# Patient Record
Sex: FEMALE | Race: WHITE | Employment: OTHER | RURAL
[De-identification: names, ages, dates, MRNs, and addresses within clinical notes are randomized per-mention and may not be internally consistent; named-entity substitution may affect disease eponyms.]

---

## 2021-07-30 ENCOUNTER — OFFICE VISIT (OUTPATIENT)
Dept: CARDIOLOGY CLINIC | Age: 76
End: 2021-07-30
Payer: MEDICARE

## 2021-07-30 VITALS
RESPIRATION RATE: 16 BRPM | WEIGHT: 119 LBS | DIASTOLIC BLOOD PRESSURE: 98 MMHG | BODY MASS INDEX: 21.9 KG/M2 | HEART RATE: 64 BPM | OXYGEN SATURATION: 98 % | HEIGHT: 62 IN | SYSTOLIC BLOOD PRESSURE: 180 MMHG

## 2021-07-30 DIAGNOSIS — I10 ESSENTIAL HYPERTENSION: Primary | ICD-10-CM

## 2021-07-30 DIAGNOSIS — F41.9 ANXIETY: ICD-10-CM

## 2021-07-30 DIAGNOSIS — E78.5 DYSLIPIDEMIA: ICD-10-CM

## 2021-07-30 DIAGNOSIS — E03.9 ACQUIRED HYPOTHYROIDISM: ICD-10-CM

## 2021-07-30 DIAGNOSIS — R94.31 ABNORMAL EKG: ICD-10-CM

## 2021-07-30 DIAGNOSIS — Z82.49 FH: CAD (CORONARY ARTERY DISEASE): ICD-10-CM

## 2021-07-30 PROCEDURE — G8400 PT W/DXA NO RESULTS DOC: HCPCS | Performed by: INTERNAL MEDICINE

## 2021-07-30 PROCEDURE — 1101F PT FALLS ASSESS-DOCD LE1/YR: CPT | Performed by: INTERNAL MEDICINE

## 2021-07-30 PROCEDURE — G8427 DOCREV CUR MEDS BY ELIG CLIN: HCPCS | Performed by: INTERNAL MEDICINE

## 2021-07-30 PROCEDURE — G8536 NO DOC ELDER MAL SCRN: HCPCS | Performed by: INTERNAL MEDICINE

## 2021-07-30 PROCEDURE — G8510 SCR DEP NEG, NO PLAN REQD: HCPCS | Performed by: INTERNAL MEDICINE

## 2021-07-30 PROCEDURE — 93000 ELECTROCARDIOGRAM COMPLETE: CPT | Performed by: INTERNAL MEDICINE

## 2021-07-30 PROCEDURE — G8420 CALC BMI NORM PARAMETERS: HCPCS | Performed by: INTERNAL MEDICINE

## 2021-07-30 PROCEDURE — 1090F PRES/ABSN URINE INCON ASSESS: CPT | Performed by: INTERNAL MEDICINE

## 2021-07-30 PROCEDURE — 99204 OFFICE O/P NEW MOD 45 MIN: CPT | Performed by: INTERNAL MEDICINE

## 2021-07-30 RX ORDER — LOVASTATIN 20 MG/1
TABLET ORAL
COMMUNITY
Start: 2021-06-02 | End: 2021-07-30 | Stop reason: ALTCHOICE

## 2021-07-30 RX ORDER — LISINOPRIL 10 MG/1
TABLET ORAL
COMMUNITY
End: 2021-07-30

## 2021-07-30 RX ORDER — VALSARTAN 80 MG/1
TABLET ORAL
COMMUNITY
Start: 2021-06-30 | End: 2021-07-30 | Stop reason: SDUPTHER

## 2021-07-30 RX ORDER — ALPRAZOLAM 0.25 MG/1
TABLET ORAL
COMMUNITY
Start: 2021-07-09

## 2021-07-30 RX ORDER — VALSARTAN 80 MG/1
80 TABLET ORAL DAILY
Qty: 90 TABLET | Refills: 1 | Status: SHIPPED | OUTPATIENT
Start: 2021-07-30 | End: 2021-08-31

## 2021-07-30 RX ORDER — ROSUVASTATIN CALCIUM 10 MG/1
10 TABLET, COATED ORAL
Qty: 90 TABLET | Refills: 1 | Status: SHIPPED | OUTPATIENT
Start: 2021-07-30

## 2021-07-30 RX ORDER — PROPRANOLOL HYDROCHLORIDE 60 MG/1
CAPSULE, EXTENDED RELEASE ORAL
COMMUNITY
Start: 2021-07-09

## 2021-07-30 RX ORDER — LEVOTHYROXINE SODIUM 25 UG/1
TABLET ORAL
COMMUNITY
Start: 2021-06-14

## 2021-07-30 RX ORDER — PROPRANOLOL HYDROCHLORIDE 120 MG/1
CAPSULE, EXTENDED RELEASE ORAL
COMMUNITY
Start: 2021-01-14

## 2021-07-30 NOTE — PROGRESS NOTES
Identified pt with two pt identifiers(name and ). Reviewed record in preparation for visit and have obtained necessary documentation. Chief Complaint   Patient presents with    New Patient    Hypertension      Vitals:    21 1332 21 1342   BP: (!) 180/100 (!) 180/98   Pulse: 64    Resp: 16    SpO2: 98%    Weight: 119 lb (54 kg)    Height: 5' 2\" (1.575 m)    PainSc:   0 - No pain        Health Maintenance Review: Patient reminded of \"due or due soon\" health maintenance. I have asked the patient to contact his/her primary care provider (PCP) for follow-up on his/her health maintenance. Coordination of Care Questionnaire:  :   1) Have you been to an emergency room, urgent care, or hospitalized since your last visit? If yes, where when, and reason for visit? no       2. Have seen or consulted any other health care provider since your last visit? If yes, where when, and reason for visit? NO      Patient is accompanied by self I have received verbal consent from Kamini Hartley to discuss any/all medical information while they are present in the room.

## 2021-07-30 NOTE — LETTER
7/30/2021    Patient: Loren Meade   YOB: 1945   Date of Visit: 7/30/2021     Brendan Osorio MD  [address]  Via Fax: [phone]    Dear Brendan Osorio MD,      Thank you for referring Ms. Loren Meade to [facility] for evaluation. My notes for this consultation are attached. If you have questions, please do not hesitate to call me. I look forward to following your patient along with you.       Sincerely,    Mayank Monte MD

## 2021-07-30 NOTE — PROGRESS NOTES
José Miguel Morel is a 68 y.o. female is here for cardiac evaluation. Hx hypertension (labile), dyslipidemia, hypothryoidism, anxiety/insomnia, early FH CAD (brother, sister, mother), followed at [facility], with recent OV's (anxiety, elev BP). Recent lipids 21 with chol 249, ECG598, HDL 84, TG 84, normal CBC, normal CMP, TSH normal..  Had seen Cardiology  in [place] (Dr. Autumn Sparks recommended stress test and echo but not done. Physically active. Currently on propranolol  + 60 (total 180 every day), and valsartan 80 rx'd--not taking for BP; lovastatin 20 for chol. . BP remains elevated. The patient denies chest pain/ shortness of breath, orthopnea, PND, LE edema, palpitations, syncope, presyncope or fatigue.        Patient Active Problem List    Diagnosis Date Noted    HTN (hypertension)     Dyslipidemia     Hypothyroidism     Anxiety     Insomnia       Berta Dunbar MD  Past Medical History:   Diagnosis Date    Anxiety     Dyslipidemia     HTN (hypertension)     Hypothyroidism     Insomnia     Osteoporosis       Past Surgical History:   Procedure Laterality Date    HX  SECTION      HX TUBAL LIGATION       Not on File   Family History   Problem Relation Age of Onset    Heart Disease Sister     Heart Disease Brother     Heart Attack Brother 58        MI, cardiac arrest    Heart Disease Mother       Social History     Socioeconomic History    Marital status: UNKNOWN     Spouse name: Not on file    Number of children: Not on file    Years of education: Not on file    Highest education level: Not on file   Occupational History    Not on file   Tobacco Use    Smoking status: Never Smoker    Smokeless tobacco: Never Used   Substance and Sexual Activity    Alcohol use: Yes     Comment: social    Drug use: Not on file    Sexual activity: Not on file   Other Topics Concern    Not on file   Social History Narrative    Not on file     Social Determinants of Health     Financial Resource Strain:     Difficulty of Paying Living Expenses:    Food Insecurity:     Worried About Running Out of Food in the Last Year:     [address] in the Last Year:    Transportation Needs:     Lack of Transportation (Medical):  Lack of Transportation (Non-Medical):    Physical Activity:     Days of Exercise per Week:     Minutes of Exercise per Session:    Stress:     Feeling of Stress :    Social Connections:     Frequency of Communication with Friends and Family:     Frequency of Social Gatherings with Friends and Family:     Attends [employer] Services:     Active Member of Clubs or Organizations:     Attends Club or Organization Meetings:     Marital Status:    Intimate Partner Violence:     Fear of Current or Ex-Partner:     Emotionally Abused:     Physically Abused:     Sexually Abused:       Current Outpatient Medications   Medication Sig    levothyroxine (SYNTHROID) 25 mcg tablet TAKE 1 TABLET BY MOUTH EVERY MORNING 30 MINUTES BEFORE BREAKFAST    propranolol LA (INDERAL LA) 120 mg SR capsule propranolol  mg capsule,24 hr,extended release   1 po qd    ALPRAZolam (XANAX) 0.25 mg tablet alprazolam 0.25 mg tablet    propranolol LA (INDERAL LA) 60 mg SR capsule TAKE 1 CAPSULE BY MOUTH EVERY DAY    valsartan (DIOVAN) 80 mg tablet Take 1 Tablet by mouth daily.  rosuvastatin (CRESTOR) 10 mg tablet Take 1 Tablet by mouth nightly. Replaces lovastatin     No current facility-administered medications for this visit. Review of Symptoms:    CONST  No weight change. No fever, chills, sweats    ENT No visual changes, URI sx, sore throat    CV  See HPI   RESP  No cough, or sputum, wheezing. Also see HPI   GI  No abdominal pain or change in bowel habits. No heartburn or dysphagia. No melena or rectal bleeding.   No dysuria, urgency, frequency, hematuria   MSKEL  No joint pain, swelling. No muscle pain.     SKIN  No rash or lesions. NEURO  No headache, syncope, or seizure. No weakness, loss of sensation, or paresthesias. PSYCH  No low mood or depression  No anxiety. HE/LYMPH  No easy bruising, abnormal bleeding, or enlarged glands. Physical ExamPhysical Exam:    Visit Vitals  BP (!) 180/98   Pulse 64   Resp 16   Ht 5' 2\" (1.575 m)   Wt 119 lb (54 kg)   SpO2 98%   BMI 21.77 kg/m²     Gen: NAD  HEENT:  PERRL, throat clear  Neck: no adenopathy, no thyromegaly, no JVD   Heart:  Regular,Nl S1S2,  no murmur, gallop or rub. Lungs:  clear  Abdomen:   Soft, non-tender, bowel sounds are active. Extremities:  No edema  Pulse: symmetric  Neuro: A&O times 3, No focal neuro deficits    Cardiographics    ECG: NSR 62, PRWP/possible old ant MI, NST      Assessment:         Patient Active Problem List    Diagnosis Date Noted    HTN (hypertension)     Dyslipidemia     Hypothyroidism     Anxiety     Insomnia       Hx hypertension (labile), dyslipidemia, hypothryoidism, anxiety/insomnia, early FH CAD (brother, sister, mother), followed at [facility], with recent OV's (anxiety, elev BP). Recent lipids 6/1/21 with chol 249, SJA792, HDL 84, TG 84, normal CBC, normal CMP, TSH normal..  Had seen Cardiology 5/18 in [place] (Dr. Robb Romberg recommended stress test and echo but not done. Physically active. Currently on propranolol  + 60 (total 180 every day), and valsartan 80 rx'(not started) for BP; lovastatin 20 for chol. . BP remains elevated.      Plan:     BP suboptimal--will go ahead and start Valsartan 80mg every day, continue Propranolol  (120+60)  Lipids not well controlled--discussed diet, exercise (already walking 3 miles/d)--will change the lovastatin to Crestor 10mg qhs (recheck lipids 3 mos with PCP)  Stress Treadmill EKG (FH, hypertension, dyslipidemia)  Echo--hypertension  Fu depending on testing    Timo Meraz MD

## 2021-08-04 ENCOUNTER — HOSPITAL ENCOUNTER (OUTPATIENT)
Dept: ULTRASOUND IMAGING | Age: 76
Discharge: HOME | End: 2021-08-04
Attending: INTERNAL MEDICINE
Payer: MEDICARE

## 2021-08-04 DIAGNOSIS — R94.31 ABNORMAL EKG: ICD-10-CM

## 2021-08-04 DIAGNOSIS — I10 ESSENTIAL HYPERTENSION: ICD-10-CM

## 2021-08-04 LAB
AV R PG: 50.76 MMHG
ECHO AO ROOT DIAM: 2.68 CM
ECHO AR MAX VEL PISA: 356.04 CM/S
ECHO AV PEAK GRADIENT: 6.09 MMHG
ECHO AV PEAK VELOCITY: 123.4 CM/S
ECHO AV REGURGITANT PHT: 791.39 MS
ECHO EST RA PRESSURE: 10 MMHG
ECHO LA AREA 4C: 13.68 CM2
ECHO LA MAJOR AXIS: 3.62 CM
ECHO LA VOL 2C: 36.98 ML (ref 22–52)
ECHO LA VOL 4C: 35.14 ML (ref 22–52)
ECHO LA VOL BP: 41.17 ML (ref 22–52)
ECHO LV E' SEPTAL VELOCITY: 7.77 CM/S
ECHO LV INTERNAL DIMENSION DIASTOLIC: 3.91 CM (ref 3.9–5.3)
ECHO LV INTERNAL DIMENSION SYSTOLIC: 2.5 CM
ECHO LV IVSD: 0.92 CM (ref 0.6–0.9)
ECHO LV MASS 2D: 109 G (ref 67–162)
ECHO LV POSTERIOR WALL DIASTOLIC: 0.92 CM (ref 0.6–0.9)
ECHO LVOT DIAM: 1.88 CM
ECHO MV A VELOCITY: 66.68 CM/S
ECHO MV E DECELERATION TIME (DT): 207.13 MS
ECHO MV E VELOCITY: 90.74 CM/S
ECHO MV E/A RATIO: 1.36
ECHO MV E/E' SEPTAL: 11.68
ECHO PV REGURGITANT MAX VELOCITY: 109.76 CM/S
ECHO RIGHT VENTRICULAR SYSTOLIC PRESSURE (RVSP): 43.85 MMHG
ECHO TV REGURGITANT MAX VELOCITY: 290.67 CM/S
ECHO TV REGURGITANT PEAK GRADIENT: 33.85 MMHG

## 2021-08-04 PROCEDURE — 93306 TTE W/DOPPLER COMPLETE: CPT

## 2021-08-04 PROCEDURE — 93306 TTE W/DOPPLER COMPLETE: CPT | Performed by: INTERNAL MEDICINE

## 2021-08-06 ENCOUNTER — TELEPHONE (OUTPATIENT)
Dept: CARDIOLOGY CLINIC | Age: 76
End: 2021-08-06

## 2021-08-06 NOTE — TELEPHONE ENCOUNTER
----- Message from Kevin Rosas MD sent at 8/5/2021  8:39 AM EDT -----  Regarding: echo  Advise echo shows normal LV pumping function, mild to moderate mitral and tricuspid regurg--no concerns but needs regular fu. Stress test not yet done. Thanks [facility]    Spoke with the patient. Verified patient with two patient identifiers. Results given and questions answered. Patient verbalized understanding.

## 2021-08-31 ENCOUNTER — OFFICE VISIT (OUTPATIENT)
Dept: CARDIOLOGY CLINIC | Age: 76
End: 2021-08-31
Payer: MEDICARE

## 2021-08-31 VITALS
RESPIRATION RATE: 16 BRPM | DIASTOLIC BLOOD PRESSURE: 90 MMHG | BODY MASS INDEX: 20.98 KG/M2 | OXYGEN SATURATION: 98 % | HEIGHT: 62 IN | HEART RATE: 71 BPM | WEIGHT: 114 LBS | SYSTOLIC BLOOD PRESSURE: 158 MMHG

## 2021-08-31 DIAGNOSIS — R94.31 ABNORMAL EKG: ICD-10-CM

## 2021-08-31 DIAGNOSIS — I10 ESSENTIAL HYPERTENSION: Primary | ICD-10-CM

## 2021-08-31 DIAGNOSIS — E78.5 DYSLIPIDEMIA: ICD-10-CM

## 2021-08-31 PROCEDURE — G8536 NO DOC ELDER MAL SCRN: HCPCS | Performed by: INTERNAL MEDICINE

## 2021-08-31 PROCEDURE — G8400 PT W/DXA NO RESULTS DOC: HCPCS | Performed by: INTERNAL MEDICINE

## 2021-08-31 PROCEDURE — 1101F PT FALLS ASSESS-DOCD LE1/YR: CPT | Performed by: INTERNAL MEDICINE

## 2021-08-31 PROCEDURE — G8432 DEP SCR NOT DOC, RNG: HCPCS | Performed by: INTERNAL MEDICINE

## 2021-08-31 PROCEDURE — 99214 OFFICE O/P EST MOD 30 MIN: CPT | Performed by: INTERNAL MEDICINE

## 2021-08-31 PROCEDURE — G8420 CALC BMI NORM PARAMETERS: HCPCS | Performed by: INTERNAL MEDICINE

## 2021-08-31 PROCEDURE — G8427 DOCREV CUR MEDS BY ELIG CLIN: HCPCS | Performed by: INTERNAL MEDICINE

## 2021-08-31 PROCEDURE — 1090F PRES/ABSN URINE INCON ASSESS: CPT | Performed by: INTERNAL MEDICINE

## 2021-08-31 RX ORDER — VALSARTAN 160 MG/1
160 TABLET ORAL
Qty: 90 TABLET | Refills: 1 | Status: SHIPPED | OUTPATIENT
Start: 2021-08-31

## 2021-08-31 NOTE — PROGRESS NOTES
Kamini Hartley is a 68 y.o. female is here for symptom-based f/u--BP running high. Hx hypertension (labile), dyslipidemia, hypothryoidism, anxiety/insomnia, early FH CAD (brother, sister, mother), followed at [facility], with recent OV's (anxiety, elev BP). Recent lipids 21 with chol 249, UIL768, HDL 84, TG 84, normal CBC, normal CMP, TSH normal..  Had seen Cardiology  in [place] (Dr. Uli Vega recommended stress test and echo but not done. Physically active. Currently on propranolol  + 60 (total 180 every day), and valsartan 80 rx'd. Seen here 21, lovastatin changed to Crestor. BP has continued to run high at home despite addition of the Valsartan 80. ECHO 21 with LVEF 65-70, mild to mod MR/TR with RVSP 43. Stress test not yet done. The patient denies chest pain/ shortness of breath, orthopnea, PND, LE edema, palpitations, syncope, presyncope or fatigue. Patient Active Problem List    Diagnosis Date Noted    HTN (hypertension)     Dyslipidemia     Hypothyroidism     Anxiety     Insomnia       Tara Pierson MD  Past Medical History:   Diagnosis Date    Anxiety     Dyslipidemia     HTN (hypertension)     Hypothyroidism     Insomnia     Osteoporosis       Past Surgical History:   Procedure Laterality Date    HX  SECTION      HX TUBAL LIGATION       No Known Allergies   Family History   Problem Relation Age of Onset    Heart Disease Sister     Heart Disease Brother     Heart Attack Brother 58        MI, cardiac arrest    Heart Disease Mother       Social History     Socioeconomic History    Marital status: UNKNOWN     Spouse name: Not on file    Number of children: Not on file    Years of education: Not on file    Highest education level: Not on file   Occupational History    Not on file   Tobacco Use    Smoking status: Never Smoker    Smokeless tobacco: Never Used   Substance and Sexual Activity    Alcohol use:  Yes Comment: social    Drug use: Not on file    Sexual activity: Not on file   Other Topics Concern    Not on file   Social History Narrative    Not on file     Social Determinants of Health     Financial Resource Strain:     Difficulty of Paying Living Expenses:    Food Insecurity:     Worried About Running Out of Food in the Last Year:     [address] in the Last Year:    Transportation Needs:     Lack of Transportation (Medical):  Lack of Transportation (Non-Medical):    Physical Activity:     Days of Exercise per Week:     Minutes of Exercise per Session:    Stress:     Feeling of Stress :    Social Connections:     Frequency of Communication with Friends and Family:     Frequency of Social Gatherings with Friends and Family:     Attends Jew Services:     Active Member of Clubs or Organizations:     Attends Club or Organization Meetings:     Marital Status:    Intimate Partner Violence:     Fear of Current or Ex-Partner:     Emotionally Abused:     Physically Abused:     Sexually Abused:       Current Outpatient Medications   Medication Sig    levothyroxine (SYNTHROID) 25 mcg tablet TAKE 1 TABLET BY MOUTH EVERY MORNING 30 MINUTES BEFORE BREAKFAST    propranolol LA (INDERAL LA) 120 mg SR capsule propranolol  mg capsule,24 hr,extended release   1 po qd    ALPRAZolam (XANAX) 0.25 mg tablet alprazolam 0.25 mg tablet    propranolol LA (INDERAL LA) 60 mg SR capsule TAKE 1 CAPSULE BY MOUTH EVERY DAY    valsartan (DIOVAN) 80 mg tablet Take 1 Tablet by mouth daily.  rosuvastatin (CRESTOR) 10 mg tablet Take 1 Tablet by mouth nightly. Replaces lovastatin     No current facility-administered medications for this visit. Review of Symptoms:    CONST  No weight change. No fever, chills, sweats    ENT No visual changes, URI sx, sore throat    CV  See HPI   RESP  No cough, or sputum, wheezing. Also see HPI   GI  No abdominal pain or change in bowel habits.   No heartburn or dysphagia. No melena or rectal bleeding.   No dysuria, urgency, frequency, hematuria   MSKEL  No joint pain, swelling. No muscle pain. SKIN  No rash or lesions. NEURO  No headache, syncope, or seizure. No weakness, loss of sensation, or paresthesias. PSYCH  No low mood or depression  No anxiety. HE/LYMPH  No easy bruising, abnormal bleeding, or enlarged glands. Physical ExamPhysical Exam:    Visit Vitals  BP (!) 158/90   Pulse 71   Resp 16   Ht 5' 2\" (1.575 m)   Wt 114 lb (51.7 kg)   SpO2 98%   BMI 20.85 kg/m²     Gen: NAD  HEENT:  PERRL, throat clear  Neck: no adenopathy, no thyromegaly, no JVD   Heart:  Regular,Nl S1S2,  no murmur, gallop or rub. Lungs:  clear  Abdomen:   Soft, non-tender, bowel sounds are active. Extremities:  No edema  Pulse: symmetric  Neuro: A&O times 3, No focal neuro deficits      Assessment:         Patient Active Problem List    Diagnosis Date Noted    HTN (hypertension)     Dyslipidemia     Hypothyroidism     Anxiety     Insomnia      68 y.o. female is here for symptom-based f/u--BP running high. Hx hypertension (labile), dyslipidemia, hypothryoidism, anxiety/insomnia, early FH CAD (brother, sister, mother), followed at [facility], with recent OV's (anxiety, elev BP). Recent lipids 6/1/21 with chol 249, [id number], HDL 84, TG 84, normal CBC, normal CMP, TSH normal..  Had seen Cardiology 5/18 in [place] (Dr. Eden Briceño recommended stress test and echo but not done. Physically active. Currently on propranolol  + 60 (total 180 every day), and valsartan 80 rx'd. Seen here 7/30/21, lovastatin changed to Crestor. BP has continued to run high at home despite addition of the Valsartan 80. ECHO 8/4/21 with LVEF 65-70, mild to mod MR/TR with RVSP 43. Stress test not yet done.       Plan:     BP running high, but some confusion over when to take/what actually taking  Will move the valsartan to qHS and increase to 160mg qhs  Continue propranolol LA (taking 180 mg qAM), continue crestor.     Willie Mccormick MD

## 2021-08-31 NOTE — PROGRESS NOTES
Identified pt with two pt identifiers(name and ). Reviewed record in preparation for visit and have obtained necessary documentation. Chief Complaint   Patient presents with    Medication Evaluation     valsartan added 1mo ago      Vitals:    21 1354 21 1404   BP: (!) 168/96 (!) 158/90   Pulse: 71    Resp: 16    SpO2: 98%    Weight: 114 lb (51.7 kg)    Height: 5' 2\" (1.575 m)    PainSc:   2    PainLoc: Head        Health Maintenance Review: Patient reminded of \"due or due soon\" health maintenance. I have asked the patient to contact his/her primary care provider (PCP) for follow-up on his/her health maintenance. Coordination of Care Questionnaire:  :   1) Have you been to an emergency room, urgent care, or hospitalized since your last visit? If yes, where when, and reason for visit? no       2. Have seen or consulted any other health care provider since your last visit? If yes, where when, and reason for visit? NO      Patient is accompanied by friend I have received verbal consent from Lyubov Thurman to discuss any/all medical information while they are present in the room.

## 2021-09-24 ENCOUNTER — TELEPHONE (OUTPATIENT)
Dept: NON INVASIVE DIAGNOSTICS | Age: 76
End: 2021-09-24

## 2021-09-27 ENCOUNTER — HOSPITAL ENCOUNTER (OUTPATIENT)
Dept: NON INVASIVE DIAGNOSTICS | Age: 76
Discharge: HOME | End: 2021-09-27
Attending: INTERNAL MEDICINE
Payer: MEDICARE

## 2021-09-27 DIAGNOSIS — Z82.49 FH: CAD (CORONARY ARTERY DISEASE): ICD-10-CM

## 2021-09-27 DIAGNOSIS — E78.5 DYSLIPIDEMIA: ICD-10-CM

## 2021-09-27 DIAGNOSIS — R94.31 ABNORMAL EKG: ICD-10-CM

## 2021-09-27 DIAGNOSIS — I10 ESSENTIAL HYPERTENSION: ICD-10-CM

## 2021-09-27 PROCEDURE — 93017 CV STRESS TEST TRACING ONLY: CPT

## 2021-09-28 LAB
STRESS BASELINE DIAS BP: 113 MMHG
STRESS BASELINE HR: 113 BPM
STRESS BASELINE SYS BP: 142 MMHG
STRESS ESTIMATED WORKLOAD: 7 METS
STRESS EXERCISE DUR MIN: NORMAL MIN:SEC
STRESS PEAK DIAS BP: 120 MMHG
STRESS PEAK SYS BP: 202 MMHG
STRESS PERCENT HR ACHIEVED: 120 %
STRESS POST PEAK HR: 173 BPM
STRESS RATE PRESSURE PRODUCT: NORMAL BPM*MMHG
STRESS TARGET HR: 144 BPM

## 2021-09-29 ENCOUNTER — TELEPHONE (OUTPATIENT)
Dept: CARDIOLOGY CLINIC | Age: 76
End: 2021-09-29

## 2021-09-29 NOTE — TELEPHONE ENCOUNTER
----- Message from Chris Graham MD sent at 9/28/2021  8:11 AM EDT -----  Regarding: stress test  Advise stress test normal--no blockages or ischemia, however BP high. We had increased the valsartan to 160mg qhs along with the propranolol. If she has been taking (and took prior to stress test), would increase the valsartan to 160mg bid and monitor BP at home. Call if still elevated and should fu with PCP as planned. We can see back in 6 mos, sooner prn. Thanks [facility]    Spoke with the patient. Verified patient with two patient identifiers. Results (+echo) given and questions answered. Pt needs to review her medications before any modification. She will call back today. Patient verbalized understanding.

## 2021-09-29 NOTE — TELEPHONE ENCOUNTER
Pt returned the call. Verified patient with two patient identifiers. She held the propranolol 24 hrs prior. Will seek clarification from MD regarding valsartan increase before she doubles up.

## 2021-09-30 NOTE — TELEPHONE ENCOUNTER
Dr. Pablo Elias message relayed. Message  Received: Ubaldo Gillespie MD Ancel Hidalgo, LPN  Caller: Unspecified Chemo Escobar,  8:49 AM)  \"No  let's monitor BP at home now that back on propranolol, if BP remains elevated, then I would increase the valsartan to BID. \"      Left the pt a message x2 regarding.

## 2022-11-05 ENCOUNTER — APPOINTMENT (OUTPATIENT)
Dept: CT IMAGING | Age: 77
End: 2022-11-05
Attending: EMERGENCY MEDICINE
Payer: MEDICARE

## 2022-11-05 ENCOUNTER — HOSPITAL ENCOUNTER (EMERGENCY)
Age: 77
Discharge: HOME | End: 2022-11-05
Attending: EMERGENCY MEDICINE
Payer: MEDICARE

## 2022-11-05 VITALS
TEMPERATURE: 98.5 F | RESPIRATION RATE: 18 BRPM | SYSTOLIC BLOOD PRESSURE: 126 MMHG | HEART RATE: 65 BPM | OXYGEN SATURATION: 100 % | DIASTOLIC BLOOD PRESSURE: 58 MMHG
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
## 2022-11-05 DIAGNOSIS — S02.2XXA CLOSED FRACTURE OF NASAL BONE, INITIAL ENCOUNTER: ICD-10-CM

## 2022-11-05 PROCEDURE — 70486 CT MAXILLOFACIAL W/O DYE: CPT

## 2022-11-05 PROCEDURE — 99284 EMERGENCY DEPT VISIT MOD MDM: CPT

## 2022-11-05 PROCEDURE — 70450 CT HEAD/BRAIN W/O DYE: CPT

## 2022-11-05 RX ORDER — AMOXICILLIN 875 MG/1
875 TABLET, FILM COATED ORAL 2 TIMES DAILY
Qty: 20 TABLET | Refills: 0 | Status: SHIPPED | OUTPATIENT
Start: 2022-11-05 | End: 2022-11-15

## 2022-11-05 NOTE — ED PROVIDER NOTES
EMERGENCY DEPARTMENT HISTORY AND PHYSICAL EXAM      Date: 2022  Patient Name: Eric Blanchard    History of Presenting Illness     Chief Complaint   Patient presents with    Fall       History Provided By: Patient    HPI: Eric Blanchard, 68 y.o. female with PMHx significant for hypertension and high cholesterol, presents ambulatory to the ED with cc of fall. Patient reports yesterday around noon she tripped going up the stairs and fell forward hitting her face on the step. She noticed moderate bruising to her right infraorbital area and over the bridge of her nose. She denies any vision changes. No neck pain. No nausea or vomiting. No other injuries. Pain well controlled. Does complain of an aching in the bruised area. She had some brief nosebleeding initially. It resolved yesterday shortly after the accident. No loss of consciousness. No significant headache. No problems ambulating. She is not on any anticoagulants. Tetanus up-to-date. PMHx: Significant for hypertension, high cholesterol, hypothyroidism  PSHx: Significant for , BTL  Social Hx: Non-smoker. Rarely drinks alcohol. There are no other complaints, changes, or physical findings at this time. PCP: Anupam Donnelly MD    No current facility-administered medications on file prior to encounter. Current Outpatient Medications on File Prior to Encounter   Medication Sig Dispense Refill    valsartan (DIOVAN) 160 mg tablet Take 1 Tablet by mouth nightly.  Increased dose 90 Tablet 1    levothyroxine (SYNTHROID) 25 mcg tablet TAKE 1 TABLET BY MOUTH EVERY MORNING 30 MINUTES BEFORE BREAKFAST      propranolol LA (INDERAL LA) 120 mg SR capsule propranolol  mg capsule,24 hr,extended release   1 po qd      ALPRAZolam (XANAX) 0.25 mg tablet alprazolam 0.25 mg tablet      propranolol LA (INDERAL LA) 60 mg SR capsule TAKE 1 CAPSULE BY MOUTH EVERY DAY      rosuvastatin (CRESTOR) 10 mg tablet Take 1 Tablet by mouth nightly. Replaces lovastatin 90 Tablet 1       Past History     Past Medical History:  Past Medical History:   Diagnosis Date    Anxiety     Dyslipidemia     HTN (hypertension)     Hypothyroidism     Insomnia     Osteoporosis        Past Surgical History:  Past Surgical History:   Procedure Laterality Date    HX  SECTION      HX TUBAL LIGATION         Family History:  Family History   Problem Relation Age of Onset    Heart Disease Sister     Heart Disease Brother     Heart Attack Brother 58        MI, cardiac arrest    Heart Disease Mother        Social History:  Social History     Tobacco Use    Smoking status: Never    Smokeless tobacco: Never   Substance Use Topics    Alcohol use: Yes     Comment: social       Allergies:  No Known Allergies      Review of Systems   Review of Systems   Constitutional:  Negative for activity change, chills and fever. HENT:  Negative for congestion and sore throat. Eyes:  Negative for pain and redness. Respiratory:  Negative for cough, chest tightness and shortness of breath. Cardiovascular:  Negative for chest pain and palpitations. Gastrointestinal:  Negative for abdominal pain, diarrhea, nausea and vomiting. Genitourinary:  Negative for dysuria, frequency and urgency. Musculoskeletal:  Negative for back pain and neck pain. Skin:  Positive for rash. Neurological:  Negative for syncope, light-headedness and headaches. Psychiatric/Behavioral:  Negative for confusion. All other systems reviewed and are negative. Physical Exam   Physical Exam  Vitals and nursing note reviewed. Constitutional:       General: She is not in acute distress. Appearance: She is well-developed. She is not diaphoretic. HENT:      Head:      Comments: Moderate ecchymosis of the infraorbital area on the right. There is mild swelling over the bridge of the nose. There are some abrasions over the bridge of the nose. No active bleeding.   There is some dried blood in the bilateral naris. No septal hematoma. EOMI. No evidence of entrapment on upward or downward gaze. Nose: Nose normal.      Mouth/Throat:      Pharynx: No oropharyngeal exudate. Eyes:      General: No scleral icterus. Conjunctiva/sclera: Conjunctivae normal.      Pupils: Pupils are equal, round, and reactive to light. Neck:      Thyroid: No thyromegaly. Vascular: No JVD. Trachea: No tracheal deviation. Cardiovascular:      Rate and Rhythm: Normal rate and regular rhythm. Heart sounds: No murmur heard. No friction rub. No gallop. Pulmonary:      Effort: Pulmonary effort is normal. No respiratory distress. Breath sounds: Normal breath sounds. No stridor. No wheezing or rales. Abdominal:      General: Bowel sounds are normal. There is no distension. Palpations: Abdomen is soft. Tenderness: There is no abdominal tenderness. There is no guarding or rebound. Musculoskeletal:         General: Normal range of motion. Cervical back: Normal range of motion and neck supple. Lymphadenopathy:      Cervical: No cervical adenopathy. Skin:     General: Skin is warm and dry. Findings: No erythema or rash. Neurological:      Mental Status: She is alert and oriented to person, place, and time. Cranial Nerves: No cranial nerve deficit. Motor: No abnormal muscle tone. Coordination: Coordination normal.   Psychiatric:         Behavior: Behavior normal.           Diagnostic Study Results     Labs -   No results found for this or any previous visit (from the past 12 hour(s)). Radiologic Studies -   CT MAXILLOFACIAL WO CONT   Final Result   Nasal bone fracture with soft tissue swelling and emphysema. CT HEAD WO CONT   Final Result   No acute intracranial finding. CT Results  (Last 48 hours)                 11/05/22 1255  CT MAXILLOFACIAL WO CONT Final result    Impression:  Nasal bone fracture with soft tissue swelling and emphysema. Narrative:  EXAM: CT MAXILLOFACIAL WO CONT       INDICATION: trauma       COMPARISON: None. CONTRAST:   None. TECHNIQUE:  Multislice helical CT of the facial bones was performed in the axial   plane without intravenous contrast administration. Coronal and sagittal   reformations were generated. CT dose reduction was achieved through use of a   standardized protocol tailored for this examination and automatic exposure   control for dose modulation. FINDINGS:       Bones: There is nasal bone fracture without significant displacement. There is   right soft tissue swelling and soft tissue emphysema. Orbital walls are intact. Paranasal sinuses: Clear       Orbits: The globes, optic nerves, and extraocular muscles are within normal   limits. Miscellaneous: N/A            11/05/22 1255  CT HEAD WO CONT Final result    Impression:  No acute intracranial finding. Narrative:  EXAM: Head CT without Contrast:       TECHNIQUE: Unenhanced CT Head is performed. CT dose reduction was achieved   through use of a standardized protocol tailored for this examination and   automatic exposure control for dose modulation. INDICATION:  fall;trauma       FINDINGS:    There is no apparent acute infarct or mass, and no bleed, shift, obstructive   hydrocephalus or significant extra-axial fluid collection. Left cerebral white matter hypodensity is present, nonspecific, but favoring   chronic microangiopathy. Bone windows are unremarkable. CXR Results  (Last 48 hours)      None              Medical Decision Making   I am the first provider for this patient. I reviewed the vital signs, available nursing notes, past medical history, past surgical history, family history and social history. Vital Signs-Reviewed the patient's vital signs.   Patient Vitals for the past 12 hrs:   Temp Pulse Resp BP SpO2   11/05/22 1228 98.5 °F (36.9 °C) 65 18 (!) 126/58 100 % Records Reviewed: Nursing notes reviewed    Provider Notes (Medical Decision Making):   DDX: Facial contusion, orbital fracture, ICH. ED Course:   Initial assessment performed. The patients presenting problems have been discussed, and they are in agreement with the care plan formulated and outlined with them. I have encouraged them to ask questions as they arise throughout their visit. PROGRESS NOTE    Pt reevaluated. CT head without any acute findings. No evidence of ICH. CT max face with nasal fracture. No evidence of orbital fracture. No septal hematoma. Recommended plastic surgery follow-up in several weeks if desired. Reassured patient. Will discharge on amoxicillin due to nasal fracture. Written by Juan R Aly MD     Progress note:    Pt noted to be feeling better and ready for discharge. Updated pt and/or family on all final lab and/or  imaging findings. Will follow up as instructed. All questions have been answered, pt voiced understanding and agreement with plan. Abx were prescribed, pt advised that diarrhea and rash are possible side effects of the medications. Specific return precautions provided as well as instructions to return to the ED should sx worsen at any time. Vital signs stable for discharge. I have also put together some discharge instructions for them that include: 1) educational information regarding their diagnosis, 2) how to care for their diagnosis at home, as well a 3) list of reasons why they would want to return to the ED prior to their follow-up appointment, should their condition change. Written by Juan R Aly MD        Critical Care Time:   0    Disposition:  Discharge    PLAN:  1. Current Discharge Medication List        START taking these medications    Details   amoxicillin (AMOXIL) 875 mg tablet Take 1 Tablet by mouth two (2) times a day for 10 days.   Qty: 20 Tablet, Refills: 0  Start date: 11/5/2022, End date: 11/15/2022           2. Follow-up Information       Follow up With Specialties Details Why Contact Info    Benji Cortes MD Family Medicine Schedule an appointment as soon as possible for a visit in 1 week  [address] [address]  [phone]      [address] [facility] Emergency Medicine Go in 1 day If symptoms worsen [address] [facility]  [facility]  Schedule an appointment as soon as possible for a visit in 2 weeks As needed [facility] [address] [facility] [address]          Return to ED if worse     Diagnosis     Clinical Impression:   1. Fall, initial encounter    2. Closed fracture of nasal bone, initial encounter              Please note that this dictation was completed with [employer], the computer voice recognition software. Quite often unanticipated grammatical, syntax, homophones, and other interpretive errors are inadvertently transcribed by the computer software. Please disregard these errors. Please excuse any errors that have escaped final proofreading.

## 2022-11-05 NOTE — ED TRIAGE NOTES
Pt arrives with concerns over swelling and bruising to right eye after a fall approx 2 days ago.  Pain only with palpation

## 2022-11-05 NOTE — ED NOTES
Received assignment, assuming care. Pt fall yesterday when going up stairs onto face. Pt not on blood thinners, no LOC.

## 2022-11-06 ENCOUNTER — HOSPITAL ENCOUNTER (EMERGENCY)
Age: 77
Discharge: HOME | End: 2022-11-06
Attending: EMERGENCY MEDICINE
Payer: MEDICARE

## 2022-11-06 VITALS
SYSTOLIC BLOOD PRESSURE: 123 MMHG | DIASTOLIC BLOOD PRESSURE: 58 MMHG | WEIGHT: 115 LBS | HEART RATE: 63 BPM | OXYGEN SATURATION: 99 % | TEMPERATURE: 98.2 F | BODY MASS INDEX: 21.03 KG/M2

## 2022-11-06 DIAGNOSIS — S00.83XD FACIAL CONTUSION, SUBSEQUENT ENCOUNTER: Primary | ICD-10-CM

## 2022-11-06 PROCEDURE — 99282 EMERGENCY DEPT VISIT SF MDM: CPT

## 2022-11-06 NOTE — ED PROVIDER NOTES
EMERGENCY DEPARTMENT HISTORY AND PHYSICAL EXAM      Date: 2022  Patient Name: Daniel Amezcua    History of Presenting Illness     Chief Complaint   Patient presents with    Wound Check       History Provided By: Patient    HPI: Daniel Amezcua, 68 y.o. female with PMHx significant for hypertension, high cholesterol, presents ambulatory to the ED with cc of facial swelling. Patient had a ground-level fall yesterday. She tripped going up the stairs and fell forward hitting her face on the step. She had moderate bruising in her right inferior orbital area. Head, max face and C-spine CTs were negative here in the ED. No evidence of eye injury. She reports she had some mildly increased swelling in the area and deepening redness which concerned her and prompted her return visit today. No significantly increased pain. No fevers or chills. No vision complaints. No eye redness. No eye drainage. No bleeding. PMHx: Significant for hypertension, high cholesterol  PSHx: Significant for , BTL  Social Hx: Non-smoker. Rarely drinks alcohol. There are no other complaints, changes, or physical findings at this time. PCP: Rachael Melara MD    No current facility-administered medications on file prior to encounter. Current Outpatient Medications on File Prior to Encounter   Medication Sig Dispense Refill    amoxicillin (AMOXIL) 875 mg tablet Take 1 Tablet by mouth two (2) times a day for 10 days. 20 Tablet 0    valsartan (DIOVAN) 160 mg tablet Take 1 Tablet by mouth nightly.  Increased dose 90 Tablet 1    levothyroxine (SYNTHROID) 25 mcg tablet TAKE 1 TABLET BY MOUTH EVERY MORNING 30 MINUTES BEFORE BREAKFAST      propranolol LA (INDERAL LA) 120 mg SR capsule propranolol  mg capsule,24 hr,extended release   1 po qd      ALPRAZolam (XANAX) 0.25 mg tablet alprazolam 0.25 mg tablet      propranolol LA (INDERAL LA) 60 mg SR capsule TAKE 1 CAPSULE BY MOUTH EVERY DAY      rosuvastatin (CRESTOR) 10 mg tablet Take 1 Tablet by mouth nightly. Replaces lovastatin 90 Tablet 1       Past History     Past Medical History:  Past Medical History:   Diagnosis Date    Anxiety     Dyslipidemia     HTN (hypertension)     Hypothyroidism     Insomnia     Osteoporosis        Past Surgical History:  Past Surgical History:   Procedure Laterality Date    HX  SECTION      HX TUBAL LIGATION         Family History:  Family History   Problem Relation Age of Onset    Heart Disease Sister     Heart Disease Brother     Heart Attack Brother 58        MI, cardiac arrest    Heart Disease Mother        Social History:  Social History     Tobacco Use    Smoking status: Never    Smokeless tobacco: Never   Substance Use Topics    Alcohol use: Yes     Comment: social       Allergies:  No Known Allergies      Review of Systems   Review of Systems   Constitutional:  Negative for activity change, chills and fever. HENT:  Negative for congestion and sore throat. Eyes:  Negative for pain and redness. Respiratory:  Negative for cough, chest tightness and shortness of breath. Cardiovascular:  Negative for chest pain and palpitations. Gastrointestinal:  Negative for abdominal pain, diarrhea, nausea and vomiting. Genitourinary:  Negative for dysuria, frequency and urgency. Musculoskeletal:  Negative for back pain and neck pain. Skin:  Positive for rash. Neurological:  Negative for syncope, light-headedness and headaches. Psychiatric/Behavioral:  Negative for confusion. All other systems reviewed and are negative. Physical Exam   Physical Exam  Vitals and nursing note reviewed. Constitutional:       General: She is not in acute distress. Appearance: She is well-developed. She is not diaphoretic. HENT:      Head:      Comments: Moderate ecchymosis of inferior orbital area/right cheek. Mild soft tissue swelling. No bony step-offs. EOMI. No evidence of entrapment. Conjunctive a clear.   No eye drainage. Nose: Nose normal.      Mouth/Throat:      Pharynx: No oropharyngeal exudate. Eyes:      General: No scleral icterus. Conjunctiva/sclera: Conjunctivae normal.      Pupils: Pupils are equal, round, and reactive to light. Neck:      Thyroid: No thyromegaly. Vascular: No JVD. Trachea: No tracheal deviation. Cardiovascular:      Rate and Rhythm: Normal rate and regular rhythm. Heart sounds: No murmur heard. No friction rub. No gallop. Pulmonary:      Effort: Pulmonary effort is normal. No respiratory distress. Breath sounds: Normal breath sounds. No stridor. No wheezing or rales. Abdominal:      General: Bowel sounds are normal. There is no distension. Palpations: Abdomen is soft. Tenderness: There is no abdominal tenderness. There is no guarding or rebound. Musculoskeletal:         General: Normal range of motion. Cervical back: Normal range of motion and neck supple. Lymphadenopathy:      Cervical: No cervical adenopathy. Skin:     General: Skin is warm and dry. Findings: No erythema or rash. Neurological:      Mental Status: She is alert and oriented to person, place, and time. Cranial Nerves: No cranial nerve deficit. Motor: No abnormal muscle tone. Coordination: Coordination normal.   Psychiatric:         Behavior: Behavior normal.           Diagnostic Study Results     Labs -   No results found for this or any previous visit (from the past 12 hour(s)). Radiologic Studies -   No orders to display     CT Results  (Last 48 hours)                 11/05/22 1255  CT MAXILLOFACIAL WO CONT Final result    Impression:  Nasal bone fracture with soft tissue swelling and emphysema. Narrative:  EXAM: CT MAXILLOFACIAL WO CONT       INDICATION: trauma       COMPARISON: None. CONTRAST:   None.        TECHNIQUE:  Multislice helical CT of the facial bones was performed in the axial   plane without intravenous contrast administration. Coronal and sagittal   reformations were generated. CT dose reduction was achieved through use of a   standardized protocol tailored for this examination and automatic exposure   control for dose modulation. FINDINGS:       Bones: There is nasal bone fracture without significant displacement. There is   right soft tissue swelling and soft tissue emphysema. Orbital walls are intact. Paranasal sinuses: Clear       Orbits: The globes, optic nerves, and extraocular muscles are within normal   limits. Miscellaneous: N/A            11/05/22 1255  CT HEAD WO CONT Final result    Impression:  No acute intracranial finding. Narrative:  EXAM: Head CT without Contrast:       TECHNIQUE: Unenhanced CT Head is performed. CT dose reduction was achieved   through use of a standardized protocol tailored for this examination and   automatic exposure control for dose modulation. INDICATION:  fall;trauma       FINDINGS:    There is no apparent acute infarct or mass, and no bleed, shift, obstructive   hydrocephalus or significant extra-axial fluid collection. Left cerebral white matter hypodensity is present, nonspecific, but favoring   chronic microangiopathy. Bone windows are unremarkable. CXR Results  (Last 48 hours)      None              Medical Decision Making   I am the first provider for this patient. I reviewed the vital signs, available nursing notes, past medical history, past surgical history, family history and social history. Vital Signs-Reviewed the patient's vital signs. Patient Vitals for the past 12 hrs:   Temp Pulse BP SpO2   11/06/22 1141 98.2 °F (36.8 °C) 63 (!) 123/58 99 %           Records Reviewed: Nursing notes reviewed    Provider Notes (Medical Decision Making):   DDX:Hematoma, facial contusion    ED Course:   Initial assessment performed.  The patients presenting problems have been discussed, and they are in agreement with the care plan formulated and outlined with them. I have encouraged them to ask questions as they arise throughout their visit. PROGRESS NOTE    Pt reevaluated. Patient seen by me yesterday after ground-level fall. Negative CT imaging of head face and C-spine. Patient had mildly increased swelling and deepening of redness of ecchymotic area on right cheek. No worrisome findings on exam.  Reassured patient that this was typical healing of a hematoma that might take 2 to 3 weeks to resolve. Written by Janeen Locke MD     Progress note:    Pt noted to be feeling better and ready for discharge. Updated pt and/or family on all final lab and/or  imaging findings. Will follow up as instructed. All questions have been answered, pt voiced understanding and agreement with plan. Specific return precautions provided as well as instructions to return to the ED should sx worsen at any time. Vital signs stable for discharge. I have also put together some discharge instructions for them that include: 1) educational information regarding their diagnosis, 2) how to care for their diagnosis at home, as well a 3) list of reasons why they would want to return to the ED prior to their follow-up appointment, should their condition change. Written by Janeen Locke MD        Critical Care Time:   0    Disposition:  Discharge    PLAN:  1. Current Discharge Medication List        2. Follow-up Information       Follow up With Specialties Details Why Contact Info    Jacinda Fitzgerald MD Family Medicine Schedule an appointment as soon as possible for a visit in 1 week  [address]  [phone]            Return to ED if worse     Diagnosis     Clinical Impression:   1. Facial contusion, subsequent encounter              Please note that this dictation was completed with [employer], the [employer] voice recognition software.   Quite often unanticipated grammatical, syntax, homophones, and other interpretive errors are inadvertently transcribed by the computer software. Please disregard these errors. Please excuse any errors that have escaped final proofreading.

## 2022-11-06 NOTE — ED TRIAGE NOTES
Pt arrives with concerns of \"internal bleeding\" after experiencing a fall a few days ago. She was seen here yesterday and assessed by MD, CT scan performed. Pt voices concern d/t furthered swelling.

## 2023-05-15 ENCOUNTER — APPOINTMENT (OUTPATIENT)
Facility: HOSPITAL | Age: 78
End: 2023-05-15
Payer: MEDICARE

## 2023-05-15 ENCOUNTER — HOSPITAL ENCOUNTER (EMERGENCY)
Facility: HOSPITAL | Age: 78
Discharge: HOME | End: 2023-05-15
Attending: EMERGENCY MEDICINE
Payer: MEDICARE

## 2023-05-15 VITALS
OXYGEN SATURATION: 100 % | WEIGHT: 111 LBS | DIASTOLIC BLOOD PRESSURE: 82 MMHG | RESPIRATION RATE: 17 BRPM | HEIGHT: 62 IN | TEMPERATURE: 98.3 F | SYSTOLIC BLOOD PRESSURE: 151 MMHG | BODY MASS INDEX: 20.43 KG/M2 | HEART RATE: 65 BPM

## 2023-05-15 DIAGNOSIS — M79.605 LEFT LEG PAIN: ICD-10-CM

## 2023-05-15 DIAGNOSIS — R03.0 ELEVATED BLOOD PRESSURE READING: Primary | ICD-10-CM

## 2023-05-15 DIAGNOSIS — F41.9 ANXIETY: ICD-10-CM

## 2023-05-15 LAB — ECHO BSA: 1.48 M2

## 2023-05-15 PROCEDURE — 93971 EXTREMITY STUDY: CPT

## 2023-05-15 PROCEDURE — 99284 EMERGENCY DEPT VISIT MOD MDM: CPT

## 2023-05-15 RX ORDER — PROPRANOLOL HCL 60 MG
60 CAPSULE, EXTENDED RELEASE 24HR ORAL DAILY
Qty: 30 CAPSULE | Refills: 0 | Status: SHIPPED | OUTPATIENT
Start: 2023-05-15

## 2023-05-15 RX ORDER — ROSUVASTATIN CALCIUM 10 MG/1
10 TABLET, COATED ORAL DAILY
Qty: 30 TABLET | Refills: 0 | Status: SHIPPED | OUTPATIENT
Start: 2023-05-15

## 2023-05-15 RX ORDER — ALPRAZOLAM 0.25 MG/1
TABLET ORAL
Qty: 14 TABLET | Refills: 0 | Status: SHIPPED | OUTPATIENT
Start: 2023-05-15 | End: 2023-06-29

## 2023-05-15 RX ORDER — LEVOTHYROXINE SODIUM 0.03 MG/1
TABLET ORAL
Qty: 30 TABLET | Refills: 0 | Status: SHIPPED | OUTPATIENT
Start: 2023-05-15

## 2023-05-15 RX ORDER — VALSARTAN 160 MG/1
160 TABLET ORAL DAILY
Qty: 30 TABLET | Refills: 0 | Status: SHIPPED | OUTPATIENT
Start: 2023-05-15

## 2023-05-15 ASSESSMENT — PAIN - FUNCTIONAL ASSESSMENT: PAIN_FUNCTIONAL_ASSESSMENT: 0-10

## 2023-05-15 ASSESSMENT — LIFESTYLE VARIABLES
HOW OFTEN DO YOU HAVE A DRINK CONTAINING ALCOHOL: NEVER
HOW MANY STANDARD DRINKS CONTAINING ALCOHOL DO YOU HAVE ON A TYPICAL DAY: PATIENT DOES NOT DRINK

## 2023-05-15 ASSESSMENT — PAIN SCALES - GENERAL: PAINLEVEL_OUTOF10: 7

## 2023-05-15 NOTE — ED PROVIDER NOTES
\[facility]\"" EMERGENCY DEP  EMERGENCY DEPARTMENT ENCOUNTER       Pt Name: Analia Welch  MRN: [id number]  [place] 1945  Date of evaluation: 5/15/2023  Provider: Iain Carlson MD   PCP: Elijah Sabillon MD  Note Started: 3:25 PM 5/15/23     CHIEF COMPLAINT       Chief Complaint   Patient presents with    Leg Pain        HISTORY OF PRESENT ILLNESS: 1 or more elements      History From: Patient     Analia Welch is a 66 y.o. female who presents saying she feels like she is going to have another stroke. When asked why, she says her SHOLA has been running high. She is low on medications so she went to her PCP office today asking for a refill and they were unable to see her so recommended she come to the ER. Patient denies headache, dizziness, weakness and numbness. She states she just feels how she did before her TIA and cant explain what that means. I also asked her if she is having leg pain but she denies (despite her triage complaint of leg pain). Patient told the triage nurse she wants to know if she has a clot in her left lower leg. Nursing Notes were all reviewed and agreed with or any disagreements were addressed in the HPI. REVIEW OF SYSTEMS      Review of Systems   Constitutional:  Negative for activity change, appetite change, chills, fatigue and fever. HENT:  Negative for congestion. Eyes:  Negative for visual disturbance. Respiratory:  Negative for cough. Cardiovascular:  Negative for chest pain and leg swelling. Gastrointestinal:  Negative for abdominal pain, diarrhea and vomiting. Genitourinary:  Negative for dysuria. Musculoskeletal:  Negative for gait problem. Skin:  Negative for color change and rash. Neurological:  Negative for dizziness and weakness. Positives and Pertinent negatives as per HPI.     PAST HISTORY     Past Medical History:  Past Medical History:   Diagnosis Date    Anxiety     Dyslipidemia     HTN (hypertension)     Hypothyroidism     Insomnia

## 2023-05-15 NOTE — ED TRIAGE NOTES
Pt presents with left lower leg pain and swelling x 1 week. Pt had a DVT 2 years ago in the same leg. Pt denies any dyspnea or chest pain.

## 2023-05-18 ASSESSMENT — ENCOUNTER SYMPTOMS
VOMITING: 0
COUGH: 0
DIARRHEA: 0
ABDOMINAL PAIN: 0
COLOR CHANGE: 0

## 2023-05-19 ENCOUNTER — APPOINTMENT (OUTPATIENT)
Facility: HOSPITAL | Age: 78
End: 2023-05-19
Payer: MEDICARE

## 2023-05-19 ENCOUNTER — HOSPITAL ENCOUNTER (EMERGENCY)
Facility: HOSPITAL | Age: 78
Discharge: HOME | End: 2023-05-19
Attending: EMERGENCY MEDICINE
Payer: MEDICARE

## 2023-05-19 VITALS
RESPIRATION RATE: 16 BRPM | SYSTOLIC BLOOD PRESSURE: 170 MMHG | WEIGHT: 111 LBS | BODY MASS INDEX: 20.3 KG/M2 | DIASTOLIC BLOOD PRESSURE: 89 MMHG | HEART RATE: 80 BPM | TEMPERATURE: 98 F

## 2023-05-19 DIAGNOSIS — M79.605 LEFT LEG PAIN: Primary | ICD-10-CM

## 2023-05-19 PROCEDURE — 99284 EMERGENCY DEPT VISIT MOD MDM: CPT

## 2023-05-19 PROCEDURE — 93971 EXTREMITY STUDY: CPT

## 2023-05-19 ASSESSMENT — ENCOUNTER SYMPTOMS
EYE REDNESS: 0
SORE THROAT: 0
VOMITING: 0
COUGH: 0
NAUSEA: 0
DIARRHEA: 0
SHORTNESS OF BREATH: 0
ABDOMINAL PAIN: 0

## 2023-05-19 ASSESSMENT — PAIN SCALES - GENERAL: PAINLEVEL_OUTOF10: 2

## 2023-05-19 ASSESSMENT — PAIN - FUNCTIONAL ASSESSMENT
PAIN_FUNCTIONAL_ASSESSMENT: ACTIVITIES ARE NOT PREVENTED
PAIN_FUNCTIONAL_ASSESSMENT: 0-10

## 2023-05-19 ASSESSMENT — PAIN DESCRIPTION - DESCRIPTORS: DESCRIPTORS: ACHING

## 2023-05-19 ASSESSMENT — LIFESTYLE VARIABLES: HOW OFTEN DO YOU HAVE A DRINK CONTAINING ALCOHOL: NEVER

## 2023-05-19 ASSESSMENT — PAIN DESCRIPTION - ORIENTATION: ORIENTATION: LEFT;LOWER

## 2023-05-19 NOTE — ED PROVIDER NOTES
EMERGENCY DEPARTMENT HISTORY AND PHYSICAL EXAM      Date: 5/19/2023  Patient Name: Tracey Catalan    History of Presenting Illness     Chief Complaint   Patient presents with    Leg Pain       History Provided By: Patient    HPI: Tracey Catalan, 66 y.o. female with past medical history listed below, presents via private vehicle to the ED with cc of left leg pain. Patient sent here by PCP for Doppler ultrasound to rule out DVT. Patient reports she had a ground-level fall several weeks ago since then she has had pain in her right lateral calf area. Symptoms been going on for 2 weeks. Pain is worse if she ambulates. She has been able to bear weight without difficulty. She states she normally ambulates 2 miles a day for exercise. She is otherwise without complaints. No chest pain or shortness of breath. She did not notice any rash in the area. She does notice a painful lump when she presses on it. There are no other complaints, changes, or physical findings at this time. PCP: Angelita Hitchcock MD    No current facility-administered medications on file prior to encounter.      Current Outpatient Medications on File Prior to Encounter   Medication Sig Dispense Refill    levothyroxine (SYNTHROID) 25 MCG tablet TAKE 1 TABLET BY MOUTH EVERY MORNING 30 MINUTES BEFORE BREAKFAST 30 tablet 0    propranolol (INDERAL LA) 60 MG extended release capsule Take 1 capsule by mouth daily Take 1 tablet by mouth daily 30 capsule 0    valsartan (DIOVAN) 160 MG tablet Take 1 tablet by mouth daily 30 tablet 0    rosuvastatin (CRESTOR) 10 MG tablet Take 1 tablet by mouth daily 30 tablet 0    ALPRAZolam (XANAX) 0.25 MG tablet alprazolam 0.25 mg tablet 14 tablet 0       Past History     Past Medical History:  Past Medical History:   Diagnosis Date    Anxiety     Dyslipidemia     HTN (hypertension)     Hypothyroidism     Insomnia     Osteoporosis        Past Surgical History:  Past Surgical History:   Procedure Laterality

## 2023-05-19 NOTE — ED NOTES
Written and verbal discharge instructions reviewed with patient. All discharge medications reviewed and explained. Understanding verbalized, all questions answered. Ambulated out, gait steady.        Rebecca Elam RN  05/19/23 5709

## 2023-05-23 ENCOUNTER — APPOINTMENT (OUTPATIENT)
Facility: HOSPITAL | Age: 78
End: 2023-05-23
Payer: MEDICARE

## 2023-05-23 ENCOUNTER — HOSPITAL ENCOUNTER (INPATIENT)
Facility: HOSPITAL | Age: 78
LOS: 3 days | Discharge: HOME | End: 2023-05-29
Attending: EMERGENCY MEDICINE | Admitting: INTERNAL MEDICINE
Payer: MEDICARE

## 2023-05-23 DIAGNOSIS — R55 SYNCOPE AND COLLAPSE: ICD-10-CM

## 2023-05-23 DIAGNOSIS — S00.10XA ECCHYMOSIS OF EYELID: ICD-10-CM

## 2023-05-23 DIAGNOSIS — F41.9 ANXIETY: Primary | ICD-10-CM

## 2023-05-23 PROBLEM — R41.89 COGNITIVE IMPAIRMENT: Chronic | Status: ACTIVE | Noted: 2023-05-23

## 2023-05-23 PROBLEM — F10.90 CHRONIC ALCOHOL USE: Chronic | Status: ACTIVE | Noted: 2023-05-23

## 2023-05-23 LAB
ALBUMIN SERPL-MCNC: 3.3 G/DL (ref 3.5–5)
ALBUMIN/GLOB SERPL: 1.1 (ref 1.1–2.2)
ALP SERPL-CCNC: 74 U/L (ref 45–117)
ALT SERPL-CCNC: 23 U/L (ref 12–78)
ANION GAP SERPL CALC-SCNC: 10 MMOL/L (ref 5–15)
APPEARANCE UR: CLEAR
AST SERPL-CCNC: 33 U/L (ref 15–37)
BACTERIA URNS QL MICRO: NEGATIVE /HPF
BASOPHILS # BLD: 0 K/UL (ref 0–0.1)
BASOPHILS NFR BLD: 0 % (ref 0–1)
BILIRUB SERPL-MCNC: 0.7 MG/DL (ref 0.2–1)
BILIRUB UR QL: NEGATIVE
BUN SERPL-MCNC: 17 MG/DL (ref 6–20)
BUN/CREAT SERPL: 16 (ref 12–20)
CALCIUM SERPL-MCNC: 8.5 MG/DL (ref 8.5–10.1)
CHLORIDE SERPL-SCNC: 100 MMOL/L (ref 97–108)
CK SERPL-CCNC: 400 U/L (ref 26–192)
CO2 SERPL-SCNC: 29 MMOL/L (ref 21–32)
COLOR UR: NORMAL
CREAT SERPL-MCNC: 1.09 MG/DL (ref 0.55–1.02)
DIFFERENTIAL METHOD BLD: ABNORMAL
EOSINOPHIL # BLD: 0.2 K/UL (ref 0–0.4)
EOSINOPHIL NFR BLD: 3 % (ref 0–7)
EPITH CASTS URNS QL MICRO: NORMAL /LPF
ERYTHROCYTE [DISTWIDTH] IN BLOOD BY AUTOMATED COUNT: 13.2 % (ref 11.5–14.5)
ETHANOL SERPL-MCNC: <10 MG/DL (ref 0–0.08)
GLOBULIN SER CALC-MCNC: 3.1 G/DL (ref 2–4)
GLUCOSE SERPL-MCNC: 102 MG/DL (ref 65–100)
GLUCOSE UR STRIP.AUTO-MCNC: NEGATIVE MG/DL
HCT VFR BLD AUTO: 35 % (ref 35–47)
HGB BLD-MCNC: 11.8 G/DL (ref 11.5–16)
HGB UR QL STRIP: NEGATIVE
IMM GRANULOCYTES # BLD AUTO: 0 K/UL (ref 0–0.04)
IMM GRANULOCYTES NFR BLD AUTO: 0 % (ref 0–0.5)
INR PPP: 1 (ref 0.9–1.1)
KETONES UR QL STRIP.AUTO: NEGATIVE MG/DL
LEUKOCYTE ESTERASE UR QL STRIP.AUTO: NEGATIVE
LYMPHOCYTES # BLD: 1 K/UL (ref 0.8–3.5)
LYMPHOCYTES NFR BLD: 14 % (ref 12–49)
MAGNESIUM SERPL-MCNC: 1.6 MG/DL (ref 1.6–2.4)
MCH RBC QN AUTO: 35.4 PG (ref 26–34)
MCHC RBC AUTO-ENTMCNC: 33.7 G/DL (ref 30–36.5)
MCV RBC AUTO: 105.1 FL (ref 80–99)
MONOCYTES # BLD: 0.7 K/UL (ref 0–1)
MONOCYTES NFR BLD: 11 % (ref 5–13)
NEUTS SEG # BLD: 4.9 K/UL (ref 1.8–8)
NEUTS SEG NFR BLD: 71 % (ref 32–75)
NITRITE UR QL STRIP.AUTO: NEGATIVE
NRBC # BLD: 0 K/UL (ref 0–0.01)
NRBC BLD-RTO: 0 PER 100 WBC
PH UR STRIP: 6 (ref 5–8)
PLATELET # BLD AUTO: 307 K/UL (ref 150–400)
PMV BLD AUTO: 9.6 FL (ref 8.9–12.9)
POTASSIUM SERPL-SCNC: 3.2 MMOL/L (ref 3.5–5.1)
PROT SERPL-MCNC: 6.4 G/DL (ref 6.4–8.2)
PROT UR STRIP-MCNC: NEGATIVE MG/DL
PROTHROMBIN TIME: 9.9 SEC (ref 9–11.1)
RBC # BLD AUTO: 3.33 M/UL (ref 3.8–5.2)
RBC #/AREA URNS HPF: NORMAL /HPF (ref 0–5)
SODIUM SERPL-SCNC: 139 MMOL/L (ref 136–145)
SP GR UR REFRACTOMETRY: 1.01 (ref 1–1.03)
UROBILINOGEN UR QL STRIP.AUTO: 0.2 EU/DL (ref 0.2–1)
WBC # BLD AUTO: 6.9 K/UL (ref 3.6–11)
WBC URNS QL MICRO: NORMAL /HPF (ref 0–4)

## 2023-05-23 PROCEDURE — 36415 COLL VENOUS BLD VENIPUNCTURE: CPT

## 2023-05-23 PROCEDURE — 96360 HYDRATION IV INFUSION INIT: CPT

## 2023-05-23 PROCEDURE — 96361 HYDRATE IV INFUSION ADD-ON: CPT

## 2023-05-23 PROCEDURE — 82550 ASSAY OF CK (CPK): CPT

## 2023-05-23 PROCEDURE — 2580000003 HC RX 258: Performed by: EMERGENCY MEDICINE

## 2023-05-23 PROCEDURE — 81001 URINALYSIS AUTO W/SCOPE: CPT

## 2023-05-23 PROCEDURE — G0378 HOSPITAL OBSERVATION PER HR: HCPCS

## 2023-05-23 PROCEDURE — 82077 ASSAY SPEC XCP UR&BREATH IA: CPT

## 2023-05-23 PROCEDURE — 70551 MRI BRAIN STEM W/O DYE: CPT

## 2023-05-23 PROCEDURE — 2580000003 HC RX 258: Performed by: INTERNAL MEDICINE

## 2023-05-23 PROCEDURE — 96372 THER/PROPH/DIAG INJ SC/IM: CPT

## 2023-05-23 PROCEDURE — 99285 EMERGENCY DEPT VISIT HI MDM: CPT

## 2023-05-23 PROCEDURE — 85610 PROTHROMBIN TIME: CPT

## 2023-05-23 PROCEDURE — 97110 THERAPEUTIC EXERCISES: CPT

## 2023-05-23 PROCEDURE — 73502 X-RAY EXAM HIP UNI 2-3 VIEWS: CPT

## 2023-05-23 PROCEDURE — 80053 COMPREHEN METABOLIC PANEL: CPT

## 2023-05-23 PROCEDURE — 83735 ASSAY OF MAGNESIUM: CPT

## 2023-05-23 PROCEDURE — 97530 THERAPEUTIC ACTIVITIES: CPT

## 2023-05-23 PROCEDURE — 93005 ELECTROCARDIOGRAM TRACING: CPT | Performed by: EMERGENCY MEDICINE

## 2023-05-23 PROCEDURE — 72125 CT NECK SPINE W/O DYE: CPT

## 2023-05-23 PROCEDURE — 6370000000 HC RX 637 (ALT 250 FOR IP): Performed by: INTERNAL MEDICINE

## 2023-05-23 PROCEDURE — 97162 PT EVAL MOD COMPLEX 30 MIN: CPT

## 2023-05-23 PROCEDURE — 70486 CT MAXILLOFACIAL W/O DYE: CPT

## 2023-05-23 PROCEDURE — 97166 OT EVAL MOD COMPLEX 45 MIN: CPT

## 2023-05-23 PROCEDURE — 70450 CT HEAD/BRAIN W/O DYE: CPT

## 2023-05-23 PROCEDURE — 85025 COMPLETE CBC W/AUTO DIFF WBC: CPT

## 2023-05-23 PROCEDURE — 6360000002 HC RX W HCPCS: Performed by: INTERNAL MEDICINE

## 2023-05-23 RX ORDER — 0.9 % SODIUM CHLORIDE 0.9 %
1000 INTRAVENOUS SOLUTION INTRAVENOUS ONCE
Status: COMPLETED | OUTPATIENT
Start: 2023-05-23 | End: 2023-05-23

## 2023-05-23 RX ORDER — BETAMETHASONE DIPROPIONATE 0.5 MG/G
CREAM TOPICAL
COMMUNITY
Start: 2018-03-08

## 2023-05-23 RX ORDER — ACETAMINOPHEN 325 MG/1
650 TABLET ORAL EVERY 6 HOURS PRN
Status: DISCONTINUED | OUTPATIENT
Start: 2023-05-23 | End: 2023-05-29 | Stop reason: HOSPADM

## 2023-05-23 RX ORDER — LISINOPRIL 10 MG/1
TABLET ORAL
Status: ON HOLD | COMMUNITY
Start: 2018-04-19 | End: 2023-05-29 | Stop reason: HOSPADM

## 2023-05-23 RX ORDER — ENOXAPARIN SODIUM 100 MG/ML
30 INJECTION SUBCUTANEOUS DAILY
Status: DISCONTINUED | OUTPATIENT
Start: 2023-05-23 | End: 2023-05-29 | Stop reason: HOSPADM

## 2023-05-23 RX ORDER — LOSARTAN POTASSIUM 100 MG/1
100 TABLET ORAL
Status: DISCONTINUED | OUTPATIENT
Start: 2023-05-23 | End: 2023-05-29 | Stop reason: HOSPADM

## 2023-05-23 RX ORDER — HYDROCHLOROTHIAZIDE 12.5 MG/1
TABLET ORAL
COMMUNITY
Start: 2022-05-09

## 2023-05-23 RX ORDER — POTASSIUM CHLORIDE 750 MG/1
10 TABLET, FILM COATED, EXTENDED RELEASE ORAL
Status: COMPLETED | OUTPATIENT
Start: 2023-05-23 | End: 2023-05-25

## 2023-05-23 RX ORDER — LEVOTHYROXINE SODIUM 0.03 MG/1
25 TABLET ORAL
Status: DISCONTINUED | OUTPATIENT
Start: 2023-05-24 | End: 2023-05-29 | Stop reason: HOSPADM

## 2023-05-23 RX ORDER — LOSARTAN POTASSIUM 100 MG/1
TABLET ORAL
COMMUNITY
Start: 2022-04-14

## 2023-05-23 RX ORDER — SODIUM CHLORIDE 0.9 % (FLUSH) 0.9 %
5-40 SYRINGE (ML) INJECTION EVERY 12 HOURS SCHEDULED
Status: DISCONTINUED | OUTPATIENT
Start: 2023-05-23 | End: 2023-05-29 | Stop reason: HOSPADM

## 2023-05-23 RX ORDER — SODIUM CHLORIDE 9 MG/ML
INJECTION, SOLUTION INTRAVENOUS CONTINUOUS
Status: DISCONTINUED | OUTPATIENT
Start: 2023-05-23 | End: 2023-05-24

## 2023-05-23 RX ORDER — PROPRANOLOL HYDROCHLORIDE 120 MG/1
CAPSULE, EXTENDED RELEASE ORAL DAILY
Status: ON HOLD | COMMUNITY
Start: 2023-03-23 | End: 2023-05-29 | Stop reason: HOSPADM

## 2023-05-23 RX ORDER — SODIUM CHLORIDE 9 MG/ML
INJECTION, SOLUTION INTRAVENOUS PRN
Status: DISCONTINUED | OUTPATIENT
Start: 2023-05-23 | End: 2023-05-29 | Stop reason: HOSPADM

## 2023-05-23 RX ORDER — LOVASTATIN 10 MG/1
TABLET ORAL
Status: ON HOLD | COMMUNITY
End: 2023-05-29 | Stop reason: HOSPADM

## 2023-05-23 RX ORDER — DONEPEZIL HYDROCHLORIDE 10 MG/1
10 TABLET, FILM COATED ORAL NIGHTLY
Qty: 30 TABLET | Refills: 5 | COMMUNITY
Start: 2022-12-29 | End: 2023-06-27

## 2023-05-23 RX ORDER — ACETAMINOPHEN 650 MG/1
650 SUPPOSITORY RECTAL EVERY 6 HOURS PRN
Status: DISCONTINUED | OUTPATIENT
Start: 2023-05-23 | End: 2023-05-29 | Stop reason: HOSPADM

## 2023-05-23 RX ORDER — ONDANSETRON 2 MG/ML
4 INJECTION INTRAMUSCULAR; INTRAVENOUS EVERY 6 HOURS PRN
Status: DISCONTINUED | OUTPATIENT
Start: 2023-05-23 | End: 2023-05-29 | Stop reason: HOSPADM

## 2023-05-23 RX ORDER — SODIUM CHLORIDE 0.9 % (FLUSH) 0.9 %
5-40 SYRINGE (ML) INJECTION PRN
Status: DISCONTINUED | OUTPATIENT
Start: 2023-05-23 | End: 2023-05-29 | Stop reason: HOSPADM

## 2023-05-23 RX ORDER — POLYETHYLENE GLYCOL 3350 17 G/17G
17 POWDER, FOR SOLUTION ORAL DAILY PRN
Status: DISCONTINUED | OUTPATIENT
Start: 2023-05-23 | End: 2023-05-29 | Stop reason: HOSPADM

## 2023-05-23 RX ORDER — ZOLPIDEM TARTRATE 10 MG/1
10 TABLET ORAL NIGHTLY PRN
Status: ON HOLD | COMMUNITY
Start: 2023-04-21 | End: 2023-05-29 | Stop reason: HOSPADM

## 2023-05-23 RX ORDER — ONDANSETRON 4 MG/1
4 TABLET, ORALLY DISINTEGRATING ORAL EVERY 8 HOURS PRN
Status: DISCONTINUED | OUTPATIENT
Start: 2023-05-23 | End: 2023-05-29 | Stop reason: HOSPADM

## 2023-05-23 RX ORDER — PROPRANOLOL HCL 60 MG
180 CAPSULE, EXTENDED RELEASE 24HR ORAL DAILY
Status: DISCONTINUED | OUTPATIENT
Start: 2023-05-23 | End: 2023-05-24

## 2023-05-23 RX ADMIN — SODIUM CHLORIDE: 9 INJECTION, SOLUTION INTRAVENOUS at 13:56

## 2023-05-23 RX ADMIN — SODIUM CHLORIDE 1000 ML: 9 INJECTION, SOLUTION INTRAVENOUS at 09:43

## 2023-05-23 RX ADMIN — ENOXAPARIN SODIUM 30 MG: 100 INJECTION SUBCUTANEOUS at 16:16

## 2023-05-23 RX ADMIN — LOSARTAN POTASSIUM 100 MG: 100 TABLET, FILM COATED ORAL at 17:11

## 2023-05-23 RX ADMIN — POTASSIUM CHLORIDE 10 MEQ: 750 TABLET, FILM COATED, EXTENDED RELEASE ORAL at 20:06

## 2023-05-23 ASSESSMENT — PAIN SCALES - GENERAL: PAINLEVEL_OUTOF10: 6

## 2023-05-23 ASSESSMENT — LIFESTYLE VARIABLES
HOW MANY STANDARD DRINKS CONTAINING ALCOHOL DO YOU HAVE ON A TYPICAL DAY: PATIENT DOES NOT DRINK
HOW OFTEN DO YOU HAVE A DRINK CONTAINING ALCOHOL: NEVER

## 2023-05-23 ASSESSMENT — PAIN DESCRIPTION - LOCATION: LOCATION: ELBOW;HIP;LEG

## 2023-05-23 ASSESSMENT — PAIN - FUNCTIONAL ASSESSMENT: PAIN_FUNCTIONAL_ASSESSMENT: 0-10

## 2023-05-23 ASSESSMENT — PAIN DESCRIPTION - ORIENTATION: ORIENTATION: RIGHT

## 2023-05-23 ASSESSMENT — PAIN DESCRIPTION - ONSET: ONSET: SUDDEN

## 2023-05-23 ASSESSMENT — PAIN DESCRIPTION - PAIN TYPE: TYPE: ACUTE PAIN

## 2023-05-24 LAB
ANION GAP SERPL CALC-SCNC: 12 MMOL/L (ref 5–15)
BASOPHILS # BLD: 0 K/UL (ref 0–0.1)
BASOPHILS NFR BLD: 1 % (ref 0–1)
BUN SERPL-MCNC: 14 MG/DL (ref 6–20)
BUN/CREAT SERPL: 16 (ref 12–20)
CALCIUM SERPL-MCNC: 7.6 MG/DL (ref 8.5–10.1)
CHLORIDE SERPL-SCNC: 105 MMOL/L (ref 97–108)
CO2 SERPL-SCNC: 23 MMOL/L (ref 21–32)
CREAT SERPL-MCNC: 0.88 MG/DL (ref 0.55–1.02)
DIFFERENTIAL METHOD BLD: ABNORMAL
EOSINOPHIL # BLD: 0.4 K/UL (ref 0–0.4)
EOSINOPHIL NFR BLD: 7 % (ref 0–7)
ERYTHROCYTE [DISTWIDTH] IN BLOOD BY AUTOMATED COUNT: 13.2 % (ref 11.5–14.5)
FOLATE SERPL-MCNC: 26.6 NG/ML (ref 5–21)
GLUCOSE SERPL-MCNC: 75 MG/DL (ref 65–100)
HCT VFR BLD AUTO: 30.4 % (ref 35–47)
HEMOCCULT STL QL: NEGATIVE
HGB BLD-MCNC: 9.9 G/DL (ref 11.5–16)
IMM GRANULOCYTES # BLD AUTO: 0 K/UL (ref 0–0.04)
IMM GRANULOCYTES NFR BLD AUTO: 0 % (ref 0–0.5)
LYMPHOCYTES # BLD: 1.1 K/UL (ref 0.8–3.5)
LYMPHOCYTES NFR BLD: 20 % (ref 12–49)
MAGNESIUM SERPL-MCNC: 1.6 MG/DL (ref 1.6–2.4)
MCH RBC QN AUTO: 34.5 PG (ref 26–34)
MCHC RBC AUTO-ENTMCNC: 32.6 G/DL (ref 30–36.5)
MCV RBC AUTO: 105.9 FL (ref 80–99)
MONOCYTES # BLD: 0.6 K/UL (ref 0–1)
MONOCYTES NFR BLD: 11 % (ref 5–13)
NEUTS SEG # BLD: 3.4 K/UL (ref 1.8–8)
NEUTS SEG NFR BLD: 61 % (ref 32–75)
NRBC # BLD: 0 K/UL (ref 0–0.01)
NRBC BLD-RTO: 0 PER 100 WBC
PLATELET # BLD AUTO: 256 K/UL (ref 150–400)
PMV BLD AUTO: 10.1 FL (ref 8.9–12.9)
POTASSIUM SERPL-SCNC: 3.1 MMOL/L (ref 3.5–5.1)
RBC # BLD AUTO: 2.87 M/UL (ref 3.8–5.2)
SODIUM SERPL-SCNC: 140 MMOL/L (ref 136–145)
VIT B12 SERPL-MCNC: 283 PG/ML (ref 193–986)
WBC # BLD AUTO: 5.4 K/UL (ref 3.6–11)

## 2023-05-24 PROCEDURE — G0378 HOSPITAL OBSERVATION PER HR: HCPCS

## 2023-05-24 PROCEDURE — 83735 ASSAY OF MAGNESIUM: CPT

## 2023-05-24 PROCEDURE — 82607 VITAMIN B-12: CPT

## 2023-05-24 PROCEDURE — 82272 OCCULT BLD FECES 1-3 TESTS: CPT

## 2023-05-24 PROCEDURE — 6370000000 HC RX 637 (ALT 250 FOR IP): Performed by: INTERNAL MEDICINE

## 2023-05-24 PROCEDURE — 96361 HYDRATE IV INFUSION ADD-ON: CPT

## 2023-05-24 PROCEDURE — 96372 THER/PROPH/DIAG INJ SC/IM: CPT

## 2023-05-24 PROCEDURE — 36415 COLL VENOUS BLD VENIPUNCTURE: CPT

## 2023-05-24 PROCEDURE — 80048 BASIC METABOLIC PNL TOTAL CA: CPT

## 2023-05-24 PROCEDURE — 85025 COMPLETE CBC W/AUTO DIFF WBC: CPT

## 2023-05-24 PROCEDURE — 6360000002 HC RX W HCPCS: Performed by: INTERNAL MEDICINE

## 2023-05-24 PROCEDURE — 97535 SELF CARE MNGMENT TRAINING: CPT

## 2023-05-24 PROCEDURE — 2580000003 HC RX 258: Performed by: INTERNAL MEDICINE

## 2023-05-24 PROCEDURE — 82746 ASSAY OF FOLIC ACID SERUM: CPT

## 2023-05-24 PROCEDURE — 97110 THERAPEUTIC EXERCISES: CPT

## 2023-05-24 RX ORDER — PROPRANOLOL HCL 60 MG
120 CAPSULE, EXTENDED RELEASE 24HR ORAL DAILY
Status: DISCONTINUED | OUTPATIENT
Start: 2023-05-24 | End: 2023-05-29 | Stop reason: HOSPADM

## 2023-05-24 RX ORDER — NAPROXEN 250 MG/1
250 TABLET ORAL 2 TIMES DAILY WITH MEALS
Status: DISCONTINUED | OUTPATIENT
Start: 2023-05-24 | End: 2023-05-29 | Stop reason: HOSPADM

## 2023-05-24 RX ORDER — TRAZODONE HYDROCHLORIDE 50 MG/1
50 TABLET ORAL NIGHTLY
Status: DISCONTINUED | OUTPATIENT
Start: 2023-05-24 | End: 2023-05-29 | Stop reason: HOSPADM

## 2023-05-24 RX ADMIN — SERTRALINE 50 MG: 50 TABLET, FILM COATED ORAL at 09:19

## 2023-05-24 RX ADMIN — PROPRANOLOL HYDROCHLORIDE 120 MG: 60 CAPSULE, EXTENDED RELEASE ORAL at 09:18

## 2023-05-24 RX ADMIN — POTASSIUM CHLORIDE 10 MEQ: 750 TABLET, FILM COATED, EXTENDED RELEASE ORAL at 20:18

## 2023-05-24 RX ADMIN — ENOXAPARIN SODIUM 30 MG: 100 INJECTION SUBCUTANEOUS at 09:19

## 2023-05-24 RX ADMIN — SODIUM CHLORIDE: 9 INJECTION, SOLUTION INTRAVENOUS at 03:00

## 2023-05-24 RX ADMIN — ACETAMINOPHEN 650 MG: 325 TABLET ORAL at 12:52

## 2023-05-24 RX ADMIN — POTASSIUM CHLORIDE 10 MEQ: 750 TABLET, FILM COATED, EXTENDED RELEASE ORAL at 09:19

## 2023-05-24 RX ADMIN — LOSARTAN POTASSIUM 100 MG: 100 TABLET, FILM COATED ORAL at 16:35

## 2023-05-24 RX ADMIN — LEVOTHYROXINE SODIUM 25 MCG: 0.03 TABLET ORAL at 06:33

## 2023-05-24 RX ADMIN — NAPROXEN 250 MG: 250 TABLET ORAL at 20:17

## 2023-05-24 RX ADMIN — TRAZODONE HYDROCHLORIDE 50 MG: 50 TABLET ORAL at 20:17

## 2023-05-24 ASSESSMENT — PAIN SCALES - GENERAL
PAINLEVEL_OUTOF10: 3
PAINLEVEL_OUTOF10: 8

## 2023-05-24 ASSESSMENT — PAIN DESCRIPTION - LOCATION
LOCATION: LEG
LOCATION: HEAD

## 2023-05-24 ASSESSMENT — PAIN DESCRIPTION - ORIENTATION: ORIENTATION: LEFT

## 2023-05-24 ASSESSMENT — PAIN DESCRIPTION - DESCRIPTORS: DESCRIPTORS: ACHING

## 2023-05-25 LAB
ANION GAP SERPL CALC-SCNC: 10 MMOL/L (ref 5–15)
BUN SERPL-MCNC: 8 MG/DL (ref 6–20)
BUN/CREAT SERPL: 9 (ref 12–20)
CALCIUM SERPL-MCNC: 8 MG/DL (ref 8.5–10.1)
CHLORIDE SERPL-SCNC: 104 MMOL/L (ref 97–108)
CK SERPL-CCNC: 1355 U/L (ref 26–192)
CO2 SERPL-SCNC: 26 MMOL/L (ref 21–32)
CREAT SERPL-MCNC: 0.93 MG/DL (ref 0.55–1.02)
GLUCOSE SERPL-MCNC: 82 MG/DL (ref 65–100)
HGB BLD-MCNC: 10.4 G/DL (ref 11.5–16)
MAGNESIUM SERPL-MCNC: 1.6 MG/DL (ref 1.6–2.4)
POTASSIUM SERPL-SCNC: 3.2 MMOL/L (ref 3.5–5.1)
SODIUM SERPL-SCNC: 140 MMOL/L (ref 136–145)

## 2023-05-25 PROCEDURE — 97110 THERAPEUTIC EXERCISES: CPT

## 2023-05-25 PROCEDURE — 83735 ASSAY OF MAGNESIUM: CPT

## 2023-05-25 PROCEDURE — G0378 HOSPITAL OBSERVATION PER HR: HCPCS

## 2023-05-25 PROCEDURE — 36415 COLL VENOUS BLD VENIPUNCTURE: CPT

## 2023-05-25 PROCEDURE — 6370000000 HC RX 637 (ALT 250 FOR IP): Performed by: INTERNAL MEDICINE

## 2023-05-25 PROCEDURE — 82550 ASSAY OF CK (CPK): CPT

## 2023-05-25 PROCEDURE — 94760 N-INVAS EAR/PLS OXIMETRY 1: CPT

## 2023-05-25 PROCEDURE — 85018 HEMOGLOBIN: CPT

## 2023-05-25 PROCEDURE — 97535 SELF CARE MNGMENT TRAINING: CPT

## 2023-05-25 PROCEDURE — 97112 NEUROMUSCULAR REEDUCATION: CPT

## 2023-05-25 PROCEDURE — 80048 BASIC METABOLIC PNL TOTAL CA: CPT

## 2023-05-25 PROCEDURE — 96372 THER/PROPH/DIAG INJ SC/IM: CPT

## 2023-05-25 PROCEDURE — 6360000002 HC RX W HCPCS: Performed by: INTERNAL MEDICINE

## 2023-05-25 RX ADMIN — PROPRANOLOL HYDROCHLORIDE 120 MG: 60 CAPSULE, EXTENDED RELEASE ORAL at 08:32

## 2023-05-25 RX ADMIN — NAPROXEN 250 MG: 250 TABLET ORAL at 17:28

## 2023-05-25 RX ADMIN — NAPROXEN 250 MG: 250 TABLET ORAL at 08:33

## 2023-05-25 RX ADMIN — POTASSIUM CHLORIDE 10 MEQ: 750 TABLET, FILM COATED, EXTENDED RELEASE ORAL at 21:08

## 2023-05-25 RX ADMIN — LEVOTHYROXINE SODIUM 25 MCG: 0.03 TABLET ORAL at 06:52

## 2023-05-25 RX ADMIN — TRAZODONE HYDROCHLORIDE 50 MG: 50 TABLET ORAL at 21:08

## 2023-05-25 RX ADMIN — LOSARTAN POTASSIUM 100 MG: 100 TABLET, FILM COATED ORAL at 17:28

## 2023-05-25 RX ADMIN — ENOXAPARIN SODIUM 30 MG: 100 INJECTION SUBCUTANEOUS at 08:33

## 2023-05-25 RX ADMIN — POTASSIUM CHLORIDE 10 MEQ: 750 TABLET, FILM COATED, EXTENDED RELEASE ORAL at 08:33

## 2023-05-25 RX ADMIN — SERTRALINE 50 MG: 50 TABLET, FILM COATED ORAL at 08:33

## 2023-05-26 PROBLEM — N17.9 ARF (ACUTE RENAL FAILURE) (HCC): Status: ACTIVE | Noted: 2023-05-26

## 2023-05-26 PROBLEM — F33.1 MODERATE EPISODE OF RECURRENT MAJOR DEPRESSIVE DISORDER (HCC): Status: ACTIVE | Noted: 2023-05-26

## 2023-05-26 LAB
CK SERPL-CCNC: 1812 U/L (ref 26–192)
POTASSIUM SERPL-SCNC: 3.8 MMOL/L (ref 3.5–5.1)

## 2023-05-26 PROCEDURE — 94760 N-INVAS EAR/PLS OXIMETRY 1: CPT

## 2023-05-26 PROCEDURE — 97112 NEUROMUSCULAR REEDUCATION: CPT

## 2023-05-26 PROCEDURE — 97535 SELF CARE MNGMENT TRAINING: CPT

## 2023-05-26 PROCEDURE — 6370000000 HC RX 637 (ALT 250 FOR IP): Performed by: PSYCHIATRY & NEUROLOGY

## 2023-05-26 PROCEDURE — 96372 THER/PROPH/DIAG INJ SC/IM: CPT

## 2023-05-26 PROCEDURE — 82550 ASSAY OF CK (CPK): CPT

## 2023-05-26 PROCEDURE — 36415 COLL VENOUS BLD VENIPUNCTURE: CPT

## 2023-05-26 PROCEDURE — 84132 ASSAY OF SERUM POTASSIUM: CPT

## 2023-05-26 PROCEDURE — 97110 THERAPEUTIC EXERCISES: CPT

## 2023-05-26 PROCEDURE — 6360000002 HC RX W HCPCS: Performed by: INTERNAL MEDICINE

## 2023-05-26 PROCEDURE — 90792 PSYCH DIAG EVAL W/MED SRVCS: CPT | Performed by: PSYCHIATRY & NEUROLOGY

## 2023-05-26 PROCEDURE — 1100000000 HC RM PRIVATE

## 2023-05-26 PROCEDURE — 6370000000 HC RX 637 (ALT 250 FOR IP): Performed by: INTERNAL MEDICINE

## 2023-05-26 RX ORDER — GABAPENTIN 100 MG/1
200 CAPSULE ORAL 2 TIMES DAILY PRN
Status: DISCONTINUED | OUTPATIENT
Start: 2023-05-26 | End: 2023-05-29 | Stop reason: HOSPADM

## 2023-05-26 RX ORDER — MIRTAZAPINE 7.5 MG/1
7.5 TABLET, FILM COATED ORAL NIGHTLY
Status: DISCONTINUED | OUTPATIENT
Start: 2023-05-26 | End: 2023-05-29 | Stop reason: HOSPADM

## 2023-05-26 RX ORDER — SERTRALINE HYDROCHLORIDE 25 MG/1
25 TABLET, FILM COATED ORAL DAILY
Status: DISCONTINUED | OUTPATIENT
Start: 2023-05-27 | End: 2023-05-29 | Stop reason: HOSPADM

## 2023-05-26 RX ADMIN — MIRTAZAPINE 7.5 MG: 7.5 TABLET ORAL at 20:06

## 2023-05-26 RX ADMIN — ENOXAPARIN SODIUM 30 MG: 100 INJECTION SUBCUTANEOUS at 08:39

## 2023-05-26 RX ADMIN — LOSARTAN POTASSIUM 100 MG: 100 TABLET, FILM COATED ORAL at 16:50

## 2023-05-26 RX ADMIN — NAPROXEN 250 MG: 250 TABLET ORAL at 16:49

## 2023-05-26 RX ADMIN — SERTRALINE 50 MG: 50 TABLET, FILM COATED ORAL at 08:40

## 2023-05-26 RX ADMIN — NAPROXEN 250 MG: 250 TABLET ORAL at 08:40

## 2023-05-26 RX ADMIN — LEVOTHYROXINE SODIUM 25 MCG: 0.03 TABLET ORAL at 06:22

## 2023-05-26 RX ADMIN — PROPRANOLOL HYDROCHLORIDE 120 MG: 60 CAPSULE, EXTENDED RELEASE ORAL at 08:40

## 2023-05-26 RX ADMIN — TRAZODONE HYDROCHLORIDE 50 MG: 50 TABLET ORAL at 20:06

## 2023-05-26 RX ADMIN — GABAPENTIN 200 MG: 100 CAPSULE ORAL at 20:08

## 2023-05-27 LAB
ANION GAP SERPL CALC-SCNC: 6 MMOL/L (ref 5–15)
BASOPHILS # BLD: 0.1 K/UL (ref 0–0.1)
BASOPHILS NFR BLD: 1 % (ref 0–1)
BUN SERPL-MCNC: 14 MG/DL (ref 6–20)
BUN/CREAT SERPL: 16 (ref 12–20)
CALCIUM SERPL-MCNC: 8.4 MG/DL (ref 8.5–10.1)
CHLORIDE SERPL-SCNC: 108 MMOL/L (ref 97–108)
CK SERPL-CCNC: 1354 U/L (ref 26–192)
CO2 SERPL-SCNC: 29 MMOL/L (ref 21–32)
CREAT SERPL-MCNC: 0.9 MG/DL (ref 0.55–1.02)
DIFFERENTIAL METHOD BLD: ABNORMAL
EKG ATRIAL RATE: 58 BPM
EKG DIAGNOSIS: NORMAL
EKG P AXIS: 51 DEGREES
EKG P-R INTERVAL: 122 MS
EKG Q-T INTERVAL: 470 MS
EKG QRS DURATION: 70 MS
EKG QTC CALCULATION (BAZETT): 461 MS
EKG R AXIS: -12 DEGREES
EKG T AXIS: 14 DEGREES
EKG VENTRICULAR RATE: 58 BPM
EOSINOPHIL # BLD: 0.3 K/UL (ref 0–0.4)
EOSINOPHIL NFR BLD: 7 % (ref 0–7)
ERYTHROCYTE [DISTWIDTH] IN BLOOD BY AUTOMATED COUNT: 13 % (ref 11.5–14.5)
GLUCOSE SERPL-MCNC: 93 MG/DL (ref 65–100)
HCT VFR BLD AUTO: 30.5 % (ref 35–47)
HGB BLD-MCNC: 10 G/DL (ref 11.5–16)
IMM GRANULOCYTES # BLD AUTO: 0 K/UL (ref 0–0.04)
IMM GRANULOCYTES NFR BLD AUTO: 0 % (ref 0–0.5)
LYMPHOCYTES # BLD: 1.4 K/UL (ref 0.8–3.5)
LYMPHOCYTES NFR BLD: 30 % (ref 12–49)
MAGNESIUM SERPL-MCNC: 1.8 MG/DL (ref 1.6–2.4)
MCH RBC QN AUTO: 34.7 PG (ref 26–34)
MCHC RBC AUTO-ENTMCNC: 32.8 G/DL (ref 30–36.5)
MCV RBC AUTO: 105.9 FL (ref 80–99)
MONOCYTES # BLD: 0.7 K/UL (ref 0–1)
MONOCYTES NFR BLD: 15 % (ref 5–13)
NEUTS SEG # BLD: 2.1 K/UL (ref 1.8–8)
NEUTS SEG NFR BLD: 47 % (ref 32–75)
NRBC # BLD: 0 K/UL (ref 0–0.01)
NRBC BLD-RTO: 0 PER 100 WBC
PLATELET # BLD AUTO: 310 K/UL (ref 150–400)
PMV BLD AUTO: 10.3 FL (ref 8.9–12.9)
POTASSIUM SERPL-SCNC: 4.1 MMOL/L (ref 3.5–5.1)
RBC # BLD AUTO: 2.88 M/UL (ref 3.8–5.2)
SODIUM SERPL-SCNC: 143 MMOL/L (ref 136–145)
WBC # BLD AUTO: 4.5 K/UL (ref 3.6–11)

## 2023-05-27 PROCEDURE — 6370000000 HC RX 637 (ALT 250 FOR IP): Performed by: INTERNAL MEDICINE

## 2023-05-27 PROCEDURE — 85025 COMPLETE CBC W/AUTO DIFF WBC: CPT

## 2023-05-27 PROCEDURE — 6360000002 HC RX W HCPCS: Performed by: INTERNAL MEDICINE

## 2023-05-27 PROCEDURE — 82550 ASSAY OF CK (CPK): CPT

## 2023-05-27 PROCEDURE — 36415 COLL VENOUS BLD VENIPUNCTURE: CPT

## 2023-05-27 PROCEDURE — 2580000003 HC RX 258: Performed by: INTERNAL MEDICINE

## 2023-05-27 PROCEDURE — 97116 GAIT TRAINING THERAPY: CPT

## 2023-05-27 PROCEDURE — 83735 ASSAY OF MAGNESIUM: CPT

## 2023-05-27 PROCEDURE — 80048 BASIC METABOLIC PNL TOTAL CA: CPT

## 2023-05-27 PROCEDURE — 97530 THERAPEUTIC ACTIVITIES: CPT

## 2023-05-27 PROCEDURE — 6370000000 HC RX 637 (ALT 250 FOR IP): Performed by: PSYCHIATRY & NEUROLOGY

## 2023-05-27 PROCEDURE — 1100000000 HC RM PRIVATE

## 2023-05-27 RX ORDER — SODIUM CHLORIDE 9 MG/ML
INJECTION, SOLUTION INTRAVENOUS CONTINUOUS
Status: DISCONTINUED | OUTPATIENT
Start: 2023-05-27 | End: 2023-05-29 | Stop reason: HOSPADM

## 2023-05-27 RX ADMIN — SODIUM CHLORIDE, PRESERVATIVE FREE 10 ML: 5 INJECTION INTRAVENOUS at 21:11

## 2023-05-27 RX ADMIN — NAPROXEN 250 MG: 250 TABLET ORAL at 09:02

## 2023-05-27 RX ADMIN — PROPRANOLOL HYDROCHLORIDE 120 MG: 60 CAPSULE, EXTENDED RELEASE ORAL at 09:02

## 2023-05-27 RX ADMIN — MIRTAZAPINE 7.5 MG: 7.5 TABLET ORAL at 21:11

## 2023-05-27 RX ADMIN — LOSARTAN POTASSIUM 100 MG: 100 TABLET, FILM COATED ORAL at 17:46

## 2023-05-27 RX ADMIN — SODIUM CHLORIDE: 9 INJECTION, SOLUTION INTRAVENOUS at 19:05

## 2023-05-27 RX ADMIN — NAPROXEN 250 MG: 250 TABLET ORAL at 17:46

## 2023-05-27 RX ADMIN — TRAZODONE HYDROCHLORIDE 50 MG: 50 TABLET ORAL at 21:11

## 2023-05-27 RX ADMIN — LEVOTHYROXINE SODIUM 25 MCG: 0.03 TABLET ORAL at 06:29

## 2023-05-27 RX ADMIN — SERTRALINE 25 MG: 25 TABLET, FILM COATED ORAL at 09:02

## 2023-05-27 RX ADMIN — ENOXAPARIN SODIUM 30 MG: 100 INJECTION SUBCUTANEOUS at 09:02

## 2023-05-27 ASSESSMENT — PAIN SCALES - GENERAL
PAINLEVEL_OUTOF10: 0
PAINLEVEL_OUTOF10: 0

## 2023-05-28 LAB — CK SERPL-CCNC: 534 U/L (ref 26–192)

## 2023-05-28 PROCEDURE — 6370000000 HC RX 637 (ALT 250 FOR IP): Performed by: PSYCHIATRY & NEUROLOGY

## 2023-05-28 PROCEDURE — 1100000000 HC RM PRIVATE

## 2023-05-28 PROCEDURE — 6370000000 HC RX 637 (ALT 250 FOR IP): Performed by: INTERNAL MEDICINE

## 2023-05-28 PROCEDURE — 82550 ASSAY OF CK (CPK): CPT

## 2023-05-28 PROCEDURE — 2580000003 HC RX 258: Performed by: INTERNAL MEDICINE

## 2023-05-28 PROCEDURE — 6360000002 HC RX W HCPCS: Performed by: INTERNAL MEDICINE

## 2023-05-28 PROCEDURE — 36415 COLL VENOUS BLD VENIPUNCTURE: CPT

## 2023-05-28 RX ADMIN — SODIUM CHLORIDE, PRESERVATIVE FREE 10 ML: 5 INJECTION INTRAVENOUS at 21:59

## 2023-05-28 RX ADMIN — LOSARTAN POTASSIUM 100 MG: 100 TABLET, FILM COATED ORAL at 18:10

## 2023-05-28 RX ADMIN — NAPROXEN 250 MG: 250 TABLET ORAL at 18:10

## 2023-05-28 RX ADMIN — SERTRALINE 25 MG: 25 TABLET, FILM COATED ORAL at 09:23

## 2023-05-28 RX ADMIN — MIRTAZAPINE 7.5 MG: 7.5 TABLET ORAL at 21:58

## 2023-05-28 RX ADMIN — PROPRANOLOL HYDROCHLORIDE 120 MG: 60 CAPSULE, EXTENDED RELEASE ORAL at 09:23

## 2023-05-28 RX ADMIN — ENOXAPARIN SODIUM 30 MG: 100 INJECTION SUBCUTANEOUS at 09:23

## 2023-05-28 RX ADMIN — TRAZODONE HYDROCHLORIDE 50 MG: 50 TABLET ORAL at 21:58

## 2023-05-28 RX ADMIN — LEVOTHYROXINE SODIUM 25 MCG: 0.03 TABLET ORAL at 05:42

## 2023-05-28 RX ADMIN — NAPROXEN 250 MG: 250 TABLET ORAL at 09:23

## 2023-05-28 ASSESSMENT — PAIN SCALES - GENERAL
PAINLEVEL_OUTOF10: 0

## 2023-05-29 VITALS
BODY MASS INDEX: 20.26 KG/M2 | SYSTOLIC BLOOD PRESSURE: 150 MMHG | OXYGEN SATURATION: 95 % | DIASTOLIC BLOOD PRESSURE: 80 MMHG | RESPIRATION RATE: 20 BRPM | HEIGHT: 62 IN | WEIGHT: 110.1 LBS | HEART RATE: 65 BPM | TEMPERATURE: 98 F

## 2023-05-29 PROCEDURE — 6370000000 HC RX 637 (ALT 250 FOR IP): Performed by: INTERNAL MEDICINE

## 2023-05-29 PROCEDURE — 97535 SELF CARE MNGMENT TRAINING: CPT

## 2023-05-29 PROCEDURE — 94760 N-INVAS EAR/PLS OXIMETRY 1: CPT

## 2023-05-29 PROCEDURE — 6360000002 HC RX W HCPCS: Performed by: INTERNAL MEDICINE

## 2023-05-29 PROCEDURE — 97750 PHYSICAL PERFORMANCE TEST: CPT

## 2023-05-29 RX ORDER — PROPRANOLOL HYDROCHLORIDE 120 MG/1
120 CAPSULE, EXTENDED RELEASE ORAL DAILY
Qty: 30 CAPSULE | Refills: 3 | Status: SHIPPED | OUTPATIENT
Start: 2023-05-30

## 2023-05-29 RX ORDER — SERTRALINE HYDROCHLORIDE 25 MG/1
12.5 TABLET, FILM COATED ORAL DAILY
Status: DISCONTINUED | OUTPATIENT
Start: 2023-06-10 | End: 2023-05-29 | Stop reason: HOSPADM

## 2023-05-29 RX ORDER — SERTRALINE HYDROCHLORIDE 25 MG/1
12.5 TABLET, FILM COATED ORAL DAILY
Qty: 30 TABLET | Refills: 3 | Status: SHIPPED | OUTPATIENT
Start: 2023-06-10 | End: 2023-06-09 | Stop reason: HOSPADM

## 2023-05-29 RX ORDER — GABAPENTIN 100 MG/1
200 CAPSULE ORAL 2 TIMES DAILY PRN
Qty: 90 CAPSULE | Refills: 0 | Status: SHIPPED | OUTPATIENT
Start: 2023-05-29 | End: 2023-06-09 | Stop reason: HOSPADM

## 2023-05-29 RX ORDER — SERTRALINE HYDROCHLORIDE 25 MG/1
25 TABLET, FILM COATED ORAL DAILY
Qty: 30 TABLET | Refills: 3 | Status: SHIPPED | OUTPATIENT
Start: 2023-05-30 | End: 2023-06-09 | Stop reason: HOSPADM

## 2023-05-29 RX ORDER — MIRTAZAPINE 7.5 MG/1
7.5 TABLET, FILM COATED ORAL NIGHTLY
Qty: 30 TABLET | Refills: 3 | Status: SHIPPED | OUTPATIENT
Start: 2023-05-29 | End: 2023-06-09 | Stop reason: SDUPTHER

## 2023-05-29 RX ADMIN — ENOXAPARIN SODIUM 30 MG: 100 INJECTION SUBCUTANEOUS at 09:27

## 2023-05-29 RX ADMIN — NAPROXEN 250 MG: 250 TABLET ORAL at 09:26

## 2023-05-29 RX ADMIN — SERTRALINE 25 MG: 25 TABLET, FILM COATED ORAL at 09:26

## 2023-05-29 RX ADMIN — LEVOTHYROXINE SODIUM 25 MCG: 0.03 TABLET ORAL at 06:32

## 2023-05-29 RX ADMIN — PROPRANOLOL HYDROCHLORIDE 120 MG: 60 CAPSULE, EXTENDED RELEASE ORAL at 09:27

## 2023-05-29 ASSESSMENT — PAIN SCALES - GENERAL: PAINLEVEL_OUTOF10: 0

## 2023-05-29 NOTE — PROGRESS NOTES
Discharge Summary    Endy Cohn  :  1945  MRN:  [id number]    ADMIT DATE:  2023  DISCHARGE DATE:  2023      Discharge instruction reviewed with pt and son    Home med's returned No, no meds in hospital from home    Personal belongings returned Yes    Patient Walked to front entrance via ambulation with Nurse        SIGNED:    Sulaiman Silva RN

## 2023-05-29 NOTE — PLAN OF CARE
OCCUPATIONAL THERAPY TREATMENT/DISCHARGE  Patient: [facility] (79 y.o. female)  Date: 5/29/2023  Primary Diagnosis: Syncope and collapse [R55]  Syncope, unspecified syncope type [R55]  Ecchymosis of eyelid [S00.10XA]  ARF (acute renal failure) ([facility] [address] [N17.9]       Precautions: Fall Risk, Bed Alarm, General Precautions, Other (comment)                  Chart, occupational therapy assessment, plan of care, and goals were reviewed. ASSESSMENT  Patient continues with skilled OT services and is  progressing towards goals. Patient is at an adequate level to be discharged to home with HHS to follow up            PLAN :  Discharge from acute OT    Recommendation for discharge: (in order for the patient to meet his/her long term goals): Therapy 2 days/week in the home      IF patient discharges home will need the following DME: none       SUBJECTIVE:   Patient stated Am I going home? Bruce Maloney    OBJECTIVE DATA SUMMARY:   Cognitive/Behavioral Status:  Orientation  Overall Orientation Status: Within Functional Limits  Orientation Level: Oriented to place;Oriented to time;Oriented to situation;Oriented to person [facility] SLU[facility] score: 12/20 dementia)  Cognition  Following Commands:  Follows all commands without difficulty  Attention Span: Appears intact  Memory: Decreased recall of recent events  Safety Judgement: Decreased awareness of need for assistance;Decreased awareness of need for safety  Insights: Decreased awareness of deficits  Initiation: Requires cues for some  Sequencing: Requires cues for some    Functional Mobility and Transfers for ADLs:  Bed Mobility:  Bed Mobility Training  Bed Mobility Training: Yes  Overall Level of Assistance: Independent  Rolling: Independent  Supine to Sit: Independent  Sit to Supine: Independent  Scooting: Independent     Transfers:   Transfer Training  Transfer Training: Yes  Overall Level of Assistance: Independent  Sit to Stand: Independent  Bed to Chair: Independent  Toilet Transfer:

## 2023-05-29 NOTE — DISCHARGE INSTR - DIET

## 2023-05-29 NOTE — PLAN OF CARE
Problem: Pain  Goal: Verbalizes/displays adequate comfort level or baseline comfort level  5/29/2023 0851 by Kevin Morales RN  Outcome: Progressing  5/29/2023 0034 by Adriana Worley LPN  Outcome: Progressing     Problem: Safety - Adult  Goal: Free from fall injury  5/29/2023 0851 by Kevin Morales RN  Outcome: Progressing  5/29/2023 0034 by Adriana Worley LPN  Outcome: Progressing     Problem: ABCDS Injury Assessment  Goal: Absence of physical injury  5/29/2023 0851 by Kevin Morales RN  Outcome: Progressing  5/29/2023 0034 by Adriana Worley LPN  Outcome: Progressing     Problem: Skin/Tissue Integrity  Goal: Absence of new skin breakdown  Description: 1. Monitor for areas of redness and/or skin breakdown  2. Assess vascular access sites hourly  3. Every 4-6 hours minimum:  Change oxygen saturation probe site  4. Every 4-6 hours:  If on nasal continuous positive airway pressure, respiratory therapy assess nares and determine need for appliance change or resting period.   5/29/2023 0851 by Kevin Morales RN  Outcome: Progressing  5/29/2023 0034 by Adriana Worley LPN  Outcome: Progressing

## 2023-05-29 NOTE — PLAN OF CARE
Problem: Pain  Goal: Verbalizes/displays adequate comfort level or baseline comfort level  5/29/2023 0034 by Cheri Presley LPN  Outcome: Progressing  5/28/2023 1053 by Fátima Cuenca RN  Outcome: Progressing     Problem: Safety - Adult  Goal: Free from fall injury  5/29/2023 0034 by Cheri Presley LPN  Outcome: Progressing  5/28/2023 1053 by Fátima Cuenca RN  Outcome: Progressing     Problem: ABCDS Injury Assessment  Goal: Absence of physical injury  5/29/2023 0034 by Cheri Presley LPN  Outcome: Progressing  5/28/2023 1053 by Fátima Cuenca RN  Outcome: Progressing     Problem: Skin/Tissue Integrity  Goal: Absence of new skin breakdown  Description: 1. Monitor for areas of redness and/or skin breakdown  2. Assess vascular access sites hourly  3. Every 4-6 hours minimum:  Change oxygen saturation probe site  4. Every 4-6 hours:  If on nasal continuous positive airway pressure, respiratory therapy assess nares and determine need for appliance change or resting period.   5/29/2023 0034 by Cheri Presley LPN  Outcome: Progressing  5/28/2023 1053 by Fátima Cuenca RN  Outcome: Progressing

## 2023-05-29 NOTE — DISCHARGE INSTR - COC
Continuity of Care Form    Patient Name: Estephania Mccormick   :  1945  MRN:  [id number]    Admit date:  2023  Discharge date:  ***    Code Status Order: Full Code   Advance Directives:     Admitting Physician:  Lalit Mcfarland MD  PCP: Olegaroi Yo MD    Discharging Nurse: [facility] Unit/Room#: 126/01  Discharging Unit Phone Number: ***    Emergency Contact:   Extended Emergency Contact Information  Primary Emergency Contact: [address] [place] [address] Phone: [phone]  Mobile Phone: [phone]  Relation: Brother/Sister    Past Surgical History:  Past Surgical History:   Procedure Laterality Date     SECTION      TUBAL LIGATION         Immunization History:   Immunization History   Administered Date(s) Administered    COVID-19, MODERNA Bivalent, (age 12y+), IM, 48 mcg/0.5 mL 2022    COVID-19, PFIZER PURPLE top, DILUTE for use, (age 15 y+), 30mcg/0.3mL 2021, 2021    Influenza, FLUARIX, FLULAVAL, FLUZONE (age 10 mo+) AND AFLURIA, (age 1 y+), PF, 0.5mL 2017    Influenza, FLUZONE (age 72 y+), High Dose, 0.7mL 2020    Pneumococcal, PCV-13, PREVNAR 15, (age 6w+), IM, 0.5mL 2019    Pneumococcal, PPSV23, PNEUMOVAX 21, (age 2y+), SC/IM, 0.5mL 2017       Active Problems:  Patient Active Problem List   Diagnosis Code    Insomnia G47.00    Hypothyroidism E03.9    Dyslipidemia E78.5    Anxiety F41.9    HTN (hypertension) I10    Syncope R55    Cognitive impairment R41.89    Chronic alcohol use F10.90    Syncope and collapse R55    Syncope, unspecified syncope type R55    ARF (acute renal failure) ([facility] [address] N17.9    Moderate episode of recurrent major depressive disorder ([facility] [address] F33.1       Isolation/Infection:   Isolation            No Isolation          Patient Infection Status       None to display            Nurse Assessment:  Last Vital Signs: BP (!) 150/80   Pulse 65   Temp 98 °F (36.7 °C) (Oral)   Resp 20   Ht 1.575 m (5' 2\")   Wt 49.9 kg (110 lb 1.6

## 2023-05-29 NOTE — CARE COORDINATION
Transition of Care Plan: Home when medically stable      RUR:  13% LOW  Prior Level of Functioning: Independent   Disposition: Home with family support, home health, private duty sitting list provided  If SNF or IPR: Date FOC offered: N/A  Date FOC received:  Accepting facility: n/a  Date authorization started with reference number: n/a  Date authorization received and expires: n/a  Follow up appointments:   DME needed: None  Transportation at discharge: POV/Son  IM/IMM Medicare/ letter given: Yes 5/26/23, 5/29/23  Is patient a [place] and connected with [address] ? N/A              If yes, was [place] transfer form completed and VA notified? Caregiver Contact: Romaine Bermudez, [phone]  Discharge Caregiver contacted prior to discharge? YES  Care Conference needed? NO  Barriers to discharge: NONE               05/29/23 1220   Services At/After Discharge   Transition of Care Consult (CM Consult) Home Health;Discharge Planning   Internal Home Health No   Reason Outside [address] [place] [address] of service area   [address] Provided? No   Mode of Transport at Discharge Other (see comment)  (Son/POV)   Confirm Follow Up Transport Family   Condition of Participation: Discharge Planning   The Patient and/or Patient Representative was provided with a Choice of Provider? Patient Representative   Name of the Patient Representative who was provided with the Choice of Provider and agrees with the Discharge Plan? Lazarus Gene Nelson/Sister [phone]   The Patient and/Or Patient Representative agree with the Discharge Plan? Yes   Freedom of Choice list was provided with basic dialogue that supports the patient's individualized plan of care/goals, treatment preferences, and shares the quality data associated with the providers? Yes     Ms. Sonia Ordonez has been discharged to home this morning.  I was advised this morning around 8:40am that patient would be discharging to home

## 2023-05-29 NOTE — PROGRESS NOTES
Patient did not want to be awakened for vital signs to be taken at midnight so vitals were obtained when meds were given. Will continue to monitor.

## 2023-05-29 NOTE — PROGRESS NOTES
Patient pulled out IV access and stated \" I wasn't aware that I had an IV but don't want to be stuck again if I don't have to\". Patient isn't currently receiving any intravenous medications so the line was not replaced. Will continue to monitor.

## 2023-05-29 NOTE — DISCHARGE SUMMARY
HOSPITALIST DISCHARGE NOTE  Dana Del Castillo MD, [address] [place] [address]  Patricia Casas       PATIENT ID: Marielos Loomis  MRN: [id number]   YOB: 1945    DATE OF ADMISSION: 5/23/2023  8:48 AM    DATE OF DISCHARGE: 05/29/23    PRIMARY CARE PROVIDER: Selene Tyler MD     ATTENDING PHYSICIAN: Alireza Butler MD  DISCHARGING PROVIDER: Alireza Butler MD        CONSULTATIONS: IP CONSULT TO PSYCHIATRY    PROCEDURES/SURGERIES: * No surgery found *    [address] COURSE:     66years old  female with past medical history significant for  anxiety, hypertension, dyslipidemia, chronic insomnia, hypothyroidism was. admitted on 5/23/2023 with primary diagnosis of syncopal episode with fall , likely related to poly pharmacy in addition to alcohol. Pt was not aware of what led to the fall. She has recently been Rx Ambien for sleep, and she has combined it with Wine. Gradually more alert. Son and Sisters are interested in her needs and will be supportive on discharge. Has chronic anxiety with some depression - tx Zoloft PTA. Ongoing insomnia and day / night disequilibrium. Seen by [facility] psychiatrist and medications were reviewed and adjusted. Since then patient has been doing much better, participating with the physical therapy and doing well, while awaiting to be dischargd home with the family on Tuesday, the 30th of May 2023. DISCHARGE DIAGNOSES / PLAN:      - Status post syncope with collapse; likely iatrogenic with poly-pharmacy. - Acute traumatic rhabdomyolysis secondary to fall; Resolved  - Hypokalemia; Resolved  - Anxiety/depression/Insomnia/Cognitive impairment; Symptoms improved and stable with medications adjustment. - Chronic alcohol abuse. - Hypothyroidism.  - Dyslipidemia.  - Hypertension;  Controlled    Plan:  -- Continue Remeron while tapering Zoloft and holding ambien and xanax as per psych

## 2023-05-29 NOTE — DISCHARGE INSTRUCTIONS
DISCHARGE SUMMARY from Nurse    PATIENT INSTRUCTIONS:    After general anesthesia or intravenous sedation, for 24 hours or while taking prescription Narcotics:  Limit your activities  Do not drive and operate hazardous machinery  Do not make important personal or business decisions  Do  not drink alcoholic beverages  If you have not urinated within 8 hours after discharge, please contact your surgeon on call. Report the following to your surgeon:  Excessive pain, swelling, redness or odor of or around the surgical area  Temperature over 100.5  Nausea and vomiting lasting longer than 4 hours or if unable to take medications  Any signs of decreased circulation or nerve impairment to extremity: change in color, persistent  numbness, tingling, coldness or increase pain  Any questions    What to do at Home:  Recommended activity: activity as tolerated, Home care to follow with patient for safety. If you experience any of the following symptoms dizziness increase falls , please follow up with ER or PCP. *  Please give a list of your current medications to your Primary Care Provider. *  Please update this list whenever your medications are discontinued, doses are      changed, or new medications (including over-the-counter products) are added. *  Please carry medication information at all times in case of emergency situations. These are general instructions for a healthy lifestyle:    No smoking/ No tobacco products/ Avoid exposure to second hand smoke  Surgeon General's Warning:  Quitting smoking now greatly reduces serious risk to your health.     Obesity, smoking, and sedentary lifestyle greatly increases your risk for illness    A healthy diet, regular physical exercise & weight monitoring are important for maintaining a healthy lifestyle    You may be retaining fluid if you have a history of heart failure or if you experience any of the following symptoms:  Weight gain of 3 pounds or more overnight

## 2023-06-01 NOTE — CARE COORDINATION
6/1/23 1432    Received all from David Singleton (patient's son). He was asking if there was any follow up appointments made for psychiatry for Ms. Wicho Moran. There was NONE made. Called [facility] and spoke with Baljeet Brambila. She transferred me to [facility]. Spoke with [facility]. She said she is not sure if Dr. Jes Okeefe can see patient. They have a new NP but she doesn't see patients past a certain age group. She will call me back after she talks with Dr. Jes Okeefe. 1445: William Li  called back. Dr. Jes Okeefe. Charla scheduled for 6/9th at 3:00pm. Called David Singleton back to make him aware.

## 2023-07-05 RX ORDER — MIRTAZAPINE 15 MG/1
15 TABLET, FILM COATED ORAL NIGHTLY
Qty: 90 TABLET | Refills: 1 | Status: SHIPPED | OUTPATIENT
Start: 2023-07-05

## 2023-08-17 ENCOUNTER — HOSPITAL ENCOUNTER (OUTPATIENT)
Facility: HOSPITAL | Age: 78
Discharge: HOME | End: 2023-08-20
Payer: MEDICARE

## 2023-08-17 DIAGNOSIS — F10.90 CHRONIC ALCOHOL USE: Chronic | ICD-10-CM

## 2023-08-17 DIAGNOSIS — F33.1 MODERATE EPISODE OF RECURRENT MAJOR DEPRESSIVE DISORDER (HCC): ICD-10-CM

## 2023-08-17 DIAGNOSIS — F51.01 PRIMARY INSOMNIA: Primary | ICD-10-CM

## 2023-08-17 PROCEDURE — 99214 OFFICE O/P EST MOD 30 MIN: CPT | Performed by: PSYCHIATRY & NEUROLOGY

## 2023-08-17 RX ORDER — QUETIAPINE FUMARATE 25 MG/1
25 TABLET, FILM COATED ORAL NIGHTLY
Qty: 30 TABLET | Refills: 2 | Status: SHIPPED | OUTPATIENT
Start: 2023-08-17

## 2023-08-17 NOTE — PROGRESS NOTES
INTERVAL HISTORY (In Person):   Ms. Patti Paredes is a 80-year-old  female who is following up with me over 2 months after the initial evaluation re: some mild to moderate Major Depression, Mild Cognitive Impairment (followed by Neuro), and Alcohol Use disorder (two glasses of wine per night). She also has chronic issues with Insomnia, and she'd initially be seen in consultation by  Dr. Supa Skelton during the hospitalization for syncope (5/29/23)--taken off Xanax and Ambien, and started on Mirtazapine and GBP for anxiety. At the initial appt I increased the Mirtazapine to 15 mg and had her fully stop the low-dose GBP and complete the Zoloft taper she'd been on. She comes in today and states that she's been feeling \"pretty good\" overall, but that she's still having a lot of trouble with insomnia. She's also having some dizziness, especially in the AM. This appears to be mostly c/w orthostasis--gets dizzy when she first gets up and has to lay back down. She later seemed to indicate that she's taking an OTC sleep med and then often takes \"2 more\" when she awakens during the night. She states she's still having \"1 or 2\" glasses of wine each night, and I continued to encourage her to cut back or stop that. She did state she has some alcohol-free wine that she can drink instead. A bigger concern may be her cognitive decline, which she tends to minimize. She showed up here twice yesterday (?confused), then came to her 4 PM appt at 1:30 today. She also still has the slight mood lability--tends to laugh a little too hard for the situation at times. No agitation and she's still pleasant and cooperative. Will start Seroquel at 25 mg for sleep and have her stop the OTC meds. No med SE's with the Mirtazapine.     CURRENT MEDICATION:  Mirtazapine 15 mg qhs   Aricept 10 mg qhs  Possibly taking Benadryl heavily (2-4 pills/night)    MENTAL STATUS EXAM:  Mary Ann Pham was noted to be a casually dressed, adequately groomed, 80-year-old,

## 2023-09-13 RX ORDER — QUETIAPINE FUMARATE 25 MG/1
25 TABLET, FILM COATED ORAL NIGHTLY
Qty: 90 TABLET | Refills: 1 | Status: SHIPPED | OUTPATIENT
Start: 2023-09-13

## 2023-10-02 ENCOUNTER — HOSPITAL ENCOUNTER (OUTPATIENT)
Facility: HOSPITAL | Age: 78
Discharge: HOME | End: 2023-10-05
Payer: MEDICARE

## 2023-10-02 PROBLEM — F10.10 ALCOHOL ABUSE, UNCOMPLICATED: Status: ACTIVE | Noted: 2023-10-02

## 2023-10-02 PROBLEM — G31.84 MILD COGNITIVE IMPAIRMENT OF UNCERTAIN OR UNKNOWN ETIOLOGY: Status: ACTIVE | Noted: 2023-05-23

## 2023-10-02 PROBLEM — F33.0 MAJOR DEPRESSIVE DISORDER, RECURRENT, MILD (HCC): Status: ACTIVE | Noted: 2023-10-02

## 2023-10-02 PROCEDURE — 99214 OFFICE O/P EST MOD 30 MIN: CPT | Performed by: PSYCHIATRY & NEUROLOGY

## 2023-10-02 RX ORDER — QUETIAPINE FUMARATE 100 MG/1
100 TABLET, FILM COATED ORAL NIGHTLY
Qty: 90 TABLET | Refills: 0 | Status: SHIPPED | OUTPATIENT
Start: 2023-10-02

## 2023-10-02 NOTE — PROGRESS NOTES
INTERVAL HISTORY (In Person):   Ms. Cherrie Pedraza is a 75-year-old  female who is following up with me 7 weeks since her last appointment re: some mild to moderate Major Depression, Mild Cognitive Impairment (followed by Neuro), and Alcohol Use disorder (two glasses of wine per night). She also has chronic issues with Insomnia, and she'd initially be seen in consultation by  Dr. Vergie Fleischer during a hospitalization for syncope (5/29/23)--taken off Xanax and Ambien, and started on Mirtazapine and GBP for anxiety. At the initial appt (6/11/23), I increased the Mirtazapine to 15 mg and had her fully stop the low-dose GBP and complete the Zoloft taper she'd been on. Last time, the cognitive troubles were a little more apparent and she wasn't sleeping ing well (added Seroquel 25 mg) despite taking 2-4 Benadryl pills each night (D/C'ed at that time). She comes in today and states that she's still been feeling \"pretty good\" overall, but that she's still not sleeping well at all. She's not noticed much, if any, impact from the Seroquel 25 mg, and that she continues to stay awake almost all night, every night. She stopped taking the 2-4 Benadryl tablets each night, and that may be helping her cognitive function slightly (was on time and not in need of frequent re-orientation to her schedule like the last time). She's still in denial about her cognitive issues as well as the significance of her alcohol use. I strongly encouraged her to stop all alcohol and reiterated that it's likely the cause to her insomnia. Hasn't had any SE's with the current meds--no sedation, orthostasis, etc. She's still having \"1 or 2\" glasses of wine each night, and also continues to tell me she has alcohol-free wine and beer that she can drink, which I instructed her to do. Will increase the Seroquel to 100 mg qhs to help with sleep. Next option may be tp try Lunesta or Belsomra (failed Ambien, Trazodone 100 mg, and 25 mg of Seroquel).

## 2023-11-03 ENCOUNTER — APPOINTMENT (OUTPATIENT)
Facility: HOSPITAL | Age: 78
End: 2023-11-03
Payer: MEDICARE

## 2023-11-03 ENCOUNTER — HOSPITAL ENCOUNTER (OUTPATIENT)
Facility: HOSPITAL | Age: 78
Setting detail: OBSERVATION
Discharge: HOME | End: 2023-11-03
Attending: FAMILY MEDICINE | Admitting: STUDENT IN AN ORGANIZED HEALTH CARE EDUCATION/TRAINING PROGRAM
Payer: MEDICARE

## 2023-11-03 VITALS
TEMPERATURE: 98.1 F | BODY MASS INDEX: 24.01 KG/M2 | HEIGHT: 62 IN | DIASTOLIC BLOOD PRESSURE: 89 MMHG | RESPIRATION RATE: 18 BRPM | HEART RATE: 60 BPM | WEIGHT: 130.5 LBS | OXYGEN SATURATION: 99 % | SYSTOLIC BLOOD PRESSURE: 136 MMHG

## 2023-11-03 DIAGNOSIS — R41.0 CONFUSION: Primary | ICD-10-CM

## 2023-11-03 PROBLEM — E78.5 HYPERLIPIDEMIA: Status: ACTIVE | Noted: 2019-06-24

## 2023-11-03 PROBLEM — M54.50 LUMBAR PAIN: Status: ACTIVE | Noted: 2022-11-03

## 2023-11-03 PROBLEM — M81.0 AGE-RELATED OSTEOPOROSIS WITHOUT CURRENT PATHOLOGICAL FRACTURE: Status: ACTIVE | Noted: 2020-08-31

## 2023-11-03 PROBLEM — G30.9 ALZHEIMER DISEASE (HCC): Status: ACTIVE | Noted: 2023-07-07

## 2023-11-03 PROBLEM — H81.10 BPV (BENIGN POSITIONAL VERTIGO): Status: ACTIVE | Noted: 2023-11-03

## 2023-11-03 PROBLEM — R09.89 LABILE HYPERTENSION: Status: ACTIVE | Noted: 2018-05-14

## 2023-11-03 PROBLEM — G93.40 ENCEPHALOPATHY: Status: ACTIVE | Noted: 2023-11-03

## 2023-11-03 PROBLEM — F02.80 ALZHEIMER DISEASE (HCC): Status: ACTIVE | Noted: 2023-07-07

## 2023-11-03 PROBLEM — G92.8 DRUG-INDUCED ENCEPHALOPATHY: Status: ACTIVE | Noted: 2023-11-03

## 2023-11-03 LAB
ALBUMIN SERPL-MCNC: 3.4 G/DL (ref 3.5–5)
ALBUMIN/GLOB SERPL: 1 (ref 1.1–2.2)
ALP SERPL-CCNC: 64 U/L (ref 45–117)
ALT SERPL-CCNC: 18 U/L (ref 12–78)
AMMONIA PLAS-SCNC: <10 UMOL/L
AMPHET UR QL SCN: NEGATIVE
ANION GAP SERPL CALC-SCNC: 8 MMOL/L (ref 5–15)
ANION GAP SERPL CALC-SCNC: 8 MMOL/L (ref 5–15)
APPEARANCE UR: CLEAR
AST SERPL-CCNC: 19 U/L (ref 15–37)
BACTERIA URNS QL MICRO: NEGATIVE /HPF
BARBITURATES UR QL SCN: NEGATIVE
BASOPHILS # BLD: 0 K/UL (ref 0–0.1)
BASOPHILS # BLD: 0.1 K/UL (ref 0–0.1)
BASOPHILS NFR BLD: 1 % (ref 0–1)
BASOPHILS NFR BLD: 1 % (ref 0–1)
BENZODIAZ UR QL: POSITIVE
BILIRUB SERPL-MCNC: 0.4 MG/DL (ref 0.2–1)
BILIRUB UR QL: NEGATIVE
BUN SERPL-MCNC: 12 MG/DL (ref 6–20)
BUN SERPL-MCNC: 15 MG/DL (ref 6–20)
BUN/CREAT SERPL: 10 (ref 12–20)
BUN/CREAT SERPL: 13 (ref 12–20)
CALCIUM SERPL-MCNC: 8.6 MG/DL (ref 8.5–10.1)
CALCIUM SERPL-MCNC: 8.7 MG/DL (ref 8.5–10.1)
CANNABINOIDS UR QL SCN: NEGATIVE
CHLORIDE SERPL-SCNC: 95 MMOL/L (ref 97–108)
CHLORIDE SERPL-SCNC: 96 MMOL/L (ref 97–108)
CO2 SERPL-SCNC: 28 MMOL/L (ref 21–32)
CO2 SERPL-SCNC: 30 MMOL/L (ref 21–32)
COCAINE UR QL SCN: NEGATIVE
COLOR UR: NORMAL
CREAT SERPL-MCNC: 1.16 MG/DL (ref 0.55–1.02)
CREAT SERPL-MCNC: 1.16 MG/DL (ref 0.55–1.02)
DIFFERENTIAL METHOD BLD: NORMAL
DIFFERENTIAL METHOD BLD: NORMAL
EOSINOPHIL # BLD: 0.3 K/UL (ref 0–0.4)
EOSINOPHIL # BLD: 0.4 K/UL (ref 0–0.4)
EOSINOPHIL NFR BLD: 4 % (ref 0–7)
EOSINOPHIL NFR BLD: 4 % (ref 0–7)
EPITH CASTS URNS QL MICRO: NORMAL /LPF
ERYTHROCYTE [DISTWIDTH] IN BLOOD BY AUTOMATED COUNT: 12.3 % (ref 11.5–14.5)
ERYTHROCYTE [DISTWIDTH] IN BLOOD BY AUTOMATED COUNT: 12.5 % (ref 11.5–14.5)
ETHANOL SERPL-MCNC: <10 MG/DL (ref 0–0.08)
FOLATE SERPL-MCNC: 45.2 NG/ML (ref 5–21)
GLOBULIN SER CALC-MCNC: 3.4 G/DL (ref 2–4)
GLUCOSE BLD STRIP.AUTO-MCNC: 93 MG/DL (ref 65–117)
GLUCOSE SERPL-MCNC: 103 MG/DL (ref 65–100)
GLUCOSE SERPL-MCNC: 106 MG/DL (ref 65–100)
GLUCOSE UR STRIP.AUTO-MCNC: NEGATIVE MG/DL
HCT VFR BLD AUTO: 37.2 % (ref 35–47)
HCT VFR BLD AUTO: 39.4 % (ref 35–47)
HGB BLD-MCNC: 12.6 G/DL (ref 11.5–16)
HGB BLD-MCNC: 13.5 G/DL (ref 11.5–16)
HGB UR QL STRIP: NEGATIVE
IMM GRANULOCYTES # BLD AUTO: 0 K/UL (ref 0–0.04)
IMM GRANULOCYTES # BLD AUTO: 0 K/UL (ref 0–0.04)
IMM GRANULOCYTES NFR BLD AUTO: 0 % (ref 0–0.5)
IMM GRANULOCYTES NFR BLD AUTO: 0 % (ref 0–0.5)
KETONES UR QL STRIP.AUTO: NEGATIVE MG/DL
LEUKOCYTE ESTERASE UR QL STRIP.AUTO: NEGATIVE
LYMPHOCYTES # BLD: 1.7 K/UL (ref 0.8–3.5)
LYMPHOCYTES # BLD: 2 K/UL (ref 0.8–3.5)
LYMPHOCYTES NFR BLD: 19 % (ref 12–49)
LYMPHOCYTES NFR BLD: 23 % (ref 12–49)
Lab: ABNORMAL
MAGNESIUM SERPL-MCNC: 1.8 MG/DL (ref 1.6–2.4)
MCH RBC QN AUTO: 32.5 PG (ref 26–34)
MCH RBC QN AUTO: 33 PG (ref 26–34)
MCHC RBC AUTO-ENTMCNC: 33.9 G/DL (ref 30–36.5)
MCHC RBC AUTO-ENTMCNC: 34.3 G/DL (ref 30–36.5)
MCV RBC AUTO: 95.9 FL (ref 80–99)
MCV RBC AUTO: 96.3 FL (ref 80–99)
METHADONE UR QL: NEGATIVE
MONOCYTES # BLD: 0.6 K/UL (ref 0–1)
MONOCYTES # BLD: 1 K/UL (ref 0–1)
MONOCYTES NFR BLD: 9 % (ref 5–13)
MONOCYTES NFR BLD: 9 % (ref 5–13)
NEUTS SEG # BLD: 4.8 K/UL (ref 1.8–8)
NEUTS SEG # BLD: 7.4 K/UL (ref 1.8–8)
NEUTS SEG NFR BLD: 64 % (ref 32–75)
NEUTS SEG NFR BLD: 67 % (ref 32–75)
NITRITE UR QL STRIP.AUTO: NEGATIVE
NRBC # BLD: 0 K/UL (ref 0–0.01)
NRBC # BLD: 0 K/UL (ref 0–0.01)
NRBC BLD-RTO: 0 PER 100 WBC
NRBC BLD-RTO: 0 PER 100 WBC
OPIATES UR QL: NEGATIVE
PCP UR QL: NEGATIVE
PH UR STRIP: 5.5 (ref 5–8)
PLATELET # BLD AUTO: 330 K/UL (ref 150–400)
PLATELET # BLD AUTO: 336 K/UL (ref 150–400)
PMV BLD AUTO: 9.4 FL (ref 8.9–12.9)
PMV BLD AUTO: 9.7 FL (ref 8.9–12.9)
POTASSIUM SERPL-SCNC: 3.5 MMOL/L (ref 3.5–5.1)
POTASSIUM SERPL-SCNC: 3.6 MMOL/L (ref 3.5–5.1)
PROT SERPL-MCNC: 6.8 G/DL (ref 6.4–8.2)
PROT UR STRIP-MCNC: NEGATIVE MG/DL
RBC # BLD AUTO: 3.88 M/UL (ref 3.8–5.2)
RBC # BLD AUTO: 4.09 M/UL (ref 3.8–5.2)
RBC #/AREA URNS HPF: NORMAL /HPF (ref 0–5)
SERVICE CMNT-IMP: NORMAL
SODIUM SERPL-SCNC: 132 MMOL/L (ref 136–145)
SODIUM SERPL-SCNC: 133 MMOL/L (ref 136–145)
SP GR UR REFRACTOMETRY: 1.02 (ref 1–1.03)
T4 FREE SERPL-MCNC: 1.3 NG/DL (ref 0.8–1.5)
TSH SERPL DL<=0.05 MIU/L-ACNC: 1.43 UIU/ML (ref 0.36–3.74)
UROBILINOGEN UR QL STRIP.AUTO: 0.2 EU/DL (ref 0.2–1)
VIT B12 SERPL-MCNC: 437 PG/ML (ref 193–986)
WBC # BLD AUTO: 10.8 K/UL (ref 3.6–11)
WBC # BLD AUTO: 7.5 K/UL (ref 3.6–11)
WBC URNS QL MICRO: NORMAL /HPF (ref 0–4)

## 2023-11-03 PROCEDURE — 83735 ASSAY OF MAGNESIUM: CPT

## 2023-11-03 PROCEDURE — 81001 URINALYSIS AUTO W/SCOPE: CPT

## 2023-11-03 PROCEDURE — 36415 COLL VENOUS BLD VENIPUNCTURE: CPT

## 2023-11-03 PROCEDURE — G0378 HOSPITAL OBSERVATION PER HR: HCPCS

## 2023-11-03 PROCEDURE — 6370000000 HC RX 637 (ALT 250 FOR IP): Performed by: STUDENT IN AN ORGANIZED HEALTH CARE EDUCATION/TRAINING PROGRAM

## 2023-11-03 PROCEDURE — 97110 THERAPEUTIC EXERCISES: CPT

## 2023-11-03 PROCEDURE — 99285 EMERGENCY DEPT VISIT HI MDM: CPT

## 2023-11-03 PROCEDURE — 84439 ASSAY OF FREE THYROXINE: CPT

## 2023-11-03 PROCEDURE — 6370000000 HC RX 637 (ALT 250 FOR IP): Performed by: INTERNAL MEDICINE

## 2023-11-03 PROCEDURE — 82746 ASSAY OF FOLIC ACID SERUM: CPT

## 2023-11-03 PROCEDURE — 82077 ASSAY SPEC XCP UR&BREATH IA: CPT

## 2023-11-03 PROCEDURE — 82607 VITAMIN B-12: CPT

## 2023-11-03 PROCEDURE — 70551 MRI BRAIN STEM W/O DYE: CPT

## 2023-11-03 PROCEDURE — 82962 GLUCOSE BLOOD TEST: CPT

## 2023-11-03 PROCEDURE — 70450 CT HEAD/BRAIN W/O DYE: CPT

## 2023-11-03 PROCEDURE — 80307 DRUG TEST PRSMV CHEM ANLYZR: CPT

## 2023-11-03 PROCEDURE — 82140 ASSAY OF AMMONIA: CPT

## 2023-11-03 PROCEDURE — 93005 ELECTROCARDIOGRAM TRACING: CPT | Performed by: FAMILY MEDICINE

## 2023-11-03 PROCEDURE — 97165 OT EVAL LOW COMPLEX 30 MIN: CPT

## 2023-11-03 PROCEDURE — 2580000003 HC RX 258: Performed by: STUDENT IN AN ORGANIZED HEALTH CARE EDUCATION/TRAINING PROGRAM

## 2023-11-03 PROCEDURE — 96372 THER/PROPH/DIAG INJ SC/IM: CPT

## 2023-11-03 PROCEDURE — 84443 ASSAY THYROID STIM HORMONE: CPT

## 2023-11-03 PROCEDURE — 97161 PT EVAL LOW COMPLEX 20 MIN: CPT

## 2023-11-03 PROCEDURE — 85025 COMPLETE CBC W/AUTO DIFF WBC: CPT

## 2023-11-03 PROCEDURE — 71045 X-RAY EXAM CHEST 1 VIEW: CPT

## 2023-11-03 PROCEDURE — 80053 COMPREHEN METABOLIC PANEL: CPT

## 2023-11-03 PROCEDURE — 6360000002 HC RX W HCPCS: Performed by: STUDENT IN AN ORGANIZED HEALTH CARE EDUCATION/TRAINING PROGRAM

## 2023-11-03 RX ORDER — POLYETHYLENE GLYCOL 3350 17 G/17G
17 POWDER, FOR SOLUTION ORAL DAILY PRN
Status: DISCONTINUED | OUTPATIENT
Start: 2023-11-03 | End: 2023-11-03 | Stop reason: HOSPADM

## 2023-11-03 RX ORDER — POTASSIUM CHLORIDE 750 MG/1
40 TABLET, FILM COATED, EXTENDED RELEASE ORAL PRN
Status: DISCONTINUED | OUTPATIENT
Start: 2023-11-03 | End: 2023-11-03 | Stop reason: HOSPADM

## 2023-11-03 RX ORDER — ALPRAZOLAM 0.25 MG/1
0.25 TABLET ORAL 2 TIMES DAILY PRN
Status: DISCONTINUED | OUTPATIENT
Start: 2023-11-03 | End: 2023-11-03 | Stop reason: HOSPADM

## 2023-11-03 RX ORDER — THIAMINE HYDROCHLORIDE 100 MG/ML
100 INJECTION, SOLUTION INTRAMUSCULAR; INTRAVENOUS DAILY
Status: DISCONTINUED | OUTPATIENT
Start: 2023-11-03 | End: 2023-11-03

## 2023-11-03 RX ORDER — GABAPENTIN 100 MG/1
1 CAPSULE ORAL NIGHTLY PRN
COMMUNITY
Start: 2023-09-19

## 2023-11-03 RX ORDER — MECLIZINE HCL 12.5 MG/1
12.5 TABLET ORAL 3 TIMES DAILY PRN
Qty: 15 TABLET | Refills: 0 | Status: SHIPPED | OUTPATIENT
Start: 2023-11-03 | End: 2023-11-13

## 2023-11-03 RX ORDER — MAGNESIUM SULFATE IN WATER 40 MG/ML
2000 INJECTION, SOLUTION INTRAVENOUS PRN
Status: DISCONTINUED | OUTPATIENT
Start: 2023-11-03 | End: 2023-11-03 | Stop reason: HOSPADM

## 2023-11-03 RX ORDER — SODIUM CHLORIDE 9 MG/ML
INJECTION, SOLUTION INTRAVENOUS PRN
Status: DISCONTINUED | OUTPATIENT
Start: 2023-11-03 | End: 2023-11-03 | Stop reason: HOSPADM

## 2023-11-03 RX ORDER — SODIUM CHLORIDE 0.9 % (FLUSH) 0.9 %
5-40 SYRINGE (ML) INJECTION PRN
Status: DISCONTINUED | OUTPATIENT
Start: 2023-11-03 | End: 2023-11-03 | Stop reason: HOSPADM

## 2023-11-03 RX ORDER — PROPRANOLOL HCL 60 MG
120 CAPSULE, EXTENDED RELEASE 24HR ORAL DAILY
Status: DISCONTINUED | OUTPATIENT
Start: 2023-11-03 | End: 2023-11-03 | Stop reason: HOSPADM

## 2023-11-03 RX ORDER — QUETIAPINE FUMARATE 100 MG/1
100 TABLET, FILM COATED ORAL NIGHTLY
Status: DISCONTINUED | OUTPATIENT
Start: 2023-11-03 | End: 2023-11-03

## 2023-11-03 RX ORDER — GAUZE BANDAGE 2" X 2"
100 BANDAGE TOPICAL DAILY
Status: DISCONTINUED | OUTPATIENT
Start: 2023-11-03 | End: 2023-11-03 | Stop reason: HOSPADM

## 2023-11-03 RX ORDER — ENOXAPARIN SODIUM 100 MG/ML
40 INJECTION SUBCUTANEOUS DAILY
Status: DISCONTINUED | OUTPATIENT
Start: 2023-11-03 | End: 2023-11-03 | Stop reason: HOSPADM

## 2023-11-03 RX ORDER — QUETIAPINE FUMARATE 100 MG/1
100 TABLET, FILM COATED ORAL EVERY EVENING
Status: DISCONTINUED | OUTPATIENT
Start: 2023-11-03 | End: 2023-11-03 | Stop reason: HOSPADM

## 2023-11-03 RX ORDER — ONDANSETRON 2 MG/ML
4 INJECTION INTRAMUSCULAR; INTRAVENOUS EVERY 6 HOURS PRN
Status: DISCONTINUED | OUTPATIENT
Start: 2023-11-03 | End: 2023-11-03 | Stop reason: HOSPADM

## 2023-11-03 RX ORDER — ACETAMINOPHEN 650 MG/1
650 SUPPOSITORY RECTAL EVERY 6 HOURS PRN
Status: DISCONTINUED | OUTPATIENT
Start: 2023-11-03 | End: 2023-11-03 | Stop reason: HOSPADM

## 2023-11-03 RX ORDER — POTASSIUM CHLORIDE 7.45 MG/ML
10 INJECTION INTRAVENOUS PRN
Status: DISCONTINUED | OUTPATIENT
Start: 2023-11-03 | End: 2023-11-03 | Stop reason: HOSPADM

## 2023-11-03 RX ORDER — MIRTAZAPINE 7.5 MG/1
15 TABLET, FILM COATED ORAL NIGHTLY
Status: DISCONTINUED | OUTPATIENT
Start: 2023-11-03 | End: 2023-11-03 | Stop reason: HOSPADM

## 2023-11-03 RX ORDER — ACETAMINOPHEN 325 MG/1
650 TABLET ORAL EVERY 6 HOURS PRN
Status: DISCONTINUED | OUTPATIENT
Start: 2023-11-03 | End: 2023-11-03 | Stop reason: HOSPADM

## 2023-11-03 RX ORDER — DONEPEZIL HYDROCHLORIDE 10 MG/1
10 TABLET, FILM COATED ORAL NIGHTLY
Status: DISCONTINUED | OUTPATIENT
Start: 2023-11-03 | End: 2023-11-03 | Stop reason: HOSPADM

## 2023-11-03 RX ORDER — LEVOTHYROXINE SODIUM 0.03 MG/1
25 TABLET ORAL DAILY
Status: DISCONTINUED | OUTPATIENT
Start: 2023-11-03 | End: 2023-11-03 | Stop reason: HOSPADM

## 2023-11-03 RX ORDER — ALPRAZOLAM 0.25 MG/1
1 TABLET ORAL 2 TIMES DAILY PRN
COMMUNITY
Start: 2023-09-19

## 2023-11-03 RX ORDER — SODIUM CHLORIDE 0.9 % (FLUSH) 0.9 %
5-40 SYRINGE (ML) INJECTION EVERY 12 HOURS SCHEDULED
Status: DISCONTINUED | OUTPATIENT
Start: 2023-11-03 | End: 2023-11-03 | Stop reason: HOSPADM

## 2023-11-03 RX ORDER — ONDANSETRON 4 MG/1
4 TABLET, ORALLY DISINTEGRATING ORAL EVERY 8 HOURS PRN
Status: DISCONTINUED | OUTPATIENT
Start: 2023-11-03 | End: 2023-11-03 | Stop reason: HOSPADM

## 2023-11-03 RX ORDER — TRAZODONE HYDROCHLORIDE 100 MG/1
100 TABLET ORAL NIGHTLY PRN
COMMUNITY
Start: 2023-10-13

## 2023-11-03 RX ADMIN — LEVOTHYROXINE SODIUM 25 MCG: 0.03 TABLET ORAL at 06:18

## 2023-11-03 RX ADMIN — SODIUM CHLORIDE, PRESERVATIVE FREE 10 ML: 5 INJECTION INTRAVENOUS at 08:09

## 2023-11-03 RX ADMIN — ENOXAPARIN SODIUM 40 MG: 100 INJECTION SUBCUTANEOUS at 08:08

## 2023-11-03 RX ADMIN — Medication 100 MG: at 11:39

## 2023-11-03 RX ADMIN — PROPRANOLOL HYDROCHLORIDE 120 MG: 60 CAPSULE, EXTENDED RELEASE ORAL at 08:08

## 2023-11-03 ASSESSMENT — PAIN - FUNCTIONAL ASSESSMENT: PAIN_FUNCTIONAL_ASSESSMENT: NONE - DENIES PAIN

## 2023-11-03 NOTE — PROGRESS NOTES
Spoke with patient's sister Jada Davis due to she is listed as emergency . Jada Davis states that her sister Sissy Collins brought patient in last night due to increased dizziness. Patient was walking up stairs and stated she wasn't feeling well and requested to come to the hospital.  Patient's son Bonifacio Pulido [phone] called and was able to give clarity as to patient's baseline and recent status. Son, Bonifacio Pulido comes from [email or web address] weekly and fills her med planner. Bonifacio Pulido states that he spoke with his mother yesterday and she didn't have any confusion. He is requesting that MD calls him once he evaluates patient today. Update given to Genaro Rick RN.

## 2023-11-03 NOTE — ED PROVIDER NOTES
Medication List        ASK your doctor about these medications      betamethasone dipropionate 0.05 % cream     donepezil 10 MG tablet  Commonly known as: ARICEPT     hydroCHLOROthiazide 12.5 MG tablet  Commonly known as: HYDRODIURIL     levothyroxine 25 MCG tablet  Commonly known as: SYNTHROID  TAKE 1 TABLET BY MOUTH EVERY MORNING 30 MINUTES BEFORE BREAKFAST     losartan 100 MG tablet  Commonly known as: COZAAR     mirtazapine 15 MG tablet  Commonly known as: REMERON  Take 1 tablet by mouth nightly     propranolol 120 MG extended release capsule  Commonly known as: INDERAL LA  Take 1 capsule by mouth daily     QUEtiapine 100 MG tablet  Commonly known as: SEROQUEL  Take 1 tablet by mouth nightly     valsartan 160 MG tablet  Commonly known as: DIOVAN  Take 1 tablet by mouth daily                DISCONTINUED MEDICATIONS:  Current Discharge Medication List          I am the Primary Clinician of Record. Jey Zapata MD (electronically signed)      (Please note that parts of this dictation were completed with voice recognition software. Quite often unanticipated grammatical, syntax, homophones, and other interpretive errors are inadvertently transcribed by the computer software. Please disregards these errors.  Please excuse any errors that have escaped final proofreading.)          Jey Zapata MD  11/03/23 4112

## 2023-11-03 NOTE — PROGRESS NOTES
OCCUPATIONAL THERAPY EVALUATION/DISCHARGE  Patient: Daniela Sahu (89 y.o. female)  Date: 11/3/2023  Primary Diagnosis: Confusion [R41.0]  Encephalopathy [G93.40]         Precautions: General Precautions                  ASSESSMENT :  Based on the objective data below, the patient was up walking around in room looking for phone at cabinet, leaning down to open lower drawer. She is confused on why she is here and who brought her. OTR able to determine sister, who lives next door, brought pt at pt request. Pt seemed unaware she had dizzy spell. She lives alone, still drives, no mobility aid. She is independent at home with ADL/IADL tasks. Here she is able to demonstrate functionally full AROM and strength. She is able to get up and down from chair and walk in room w/o assist or balance issues. Does not use mobility aid at home. She should be independent for all self care needs. Functional Outcome Measure: The patient scored 90/100 on the barthel index outcome measure as OTR did not check stairs or distance ambulation and did not give full credit. PT will assess those areas. Pt has mild impairement if she continues to need supervision with distance ambulation or stairs. Further skilled acute occupational therapy is not indicated at this time. PLAN :  Recommend with staff: OTR placed alarm on chair as pt is impulsive. Defer to PT for up and moving safely w/o alarm. Recommendation for discharge: (in order for the patient to meet his/her long term goals): No skilled occupational therapy    Other factors to consider for discharge: lives alone and impaired cognition    IF patient discharges home will need the following DME: none       SUBJECTIVE:   Patient stated, Vivica Castleman you find my phone? Stephane Swift  Once determined sister who brought her might have her phone she wanted to call and check but unable to remember the phone number.     OBJECTIVE DATA SUMMARY:     Past Medical History:   Diagnosis Date    Anxiety help, physical or verbal, however minor and for whatever reason. 3. The need for supervision renders the patient not independent. 4. A patient's performance should be established using the best available evidence. Asking the patient, friends/relatives and nurses are the usual sources, but direct observation and common sense are also important. However direct testing is not needed. 5. Usually the patient's performance over the preceding 24-48 hours is important, but occasionally longer periods will be relevant. 6. Middle categories imply that the patient supplies over 50 per cent of the effort. 7. Use of aids to be independent is allowed. Brii Delgadillo., Barthel, D.W. (1193). Functional evaluation: the Barthel Index. [address] (142. Anabelle Coronel kyra MORALES SalasF, Elva Mallory., Raphael Nagel., [facility], [address] (). Measuring the change indisability after inpatient rehabilitation; comparison of the responsiveness of the Barthel Index and Functional [place] Measure. Journal of Neurology, Neurosurgery, and Psychiatry, 66(4), 121-756. Sangeeta aVzquez, N.J.A, VANESA Nix, & Celina Miller MFRANK. (2004.) Assessment of post-stroke quality of life in cost-effectiveness studies: The usefulness of the Barthel Index and the EuroQoL-5D.  Quality of Life Research, 13, 427-04       Pain Ratin/10     Activity Tolerance:   Good    After treatment:   Patient left in no apparent distress sitting up in chair, Call bell within reach, and Bed/ chair alarm activated    COMMUNICATION/EDUCATION:   The patient's plan of care was discussed with:  and rehabilitation technician    Patient Education  Education Given To: Patient  Education Provided: Role of Therapy;Orientation  Education Method: Verbal  Barriers to Learning: None  Education Outcome: Verbalized understanding    Thank you for this referral.  Ag Mendes OT  Minutes: 15    Occupational Therapy Evaluation Charge Determination   History Examination

## 2023-11-03 NOTE — ED NOTES
Admission SBAR Note  Situation/Background:     Patient is being transferred to [address] [place] [address]), Room# 120    Patient's Chief Complaint was altered mental status and is admitted for Observation. CODE STATUS: Full  CSSRS:     ISOLATION/PRECAUTIONS:   ISOLATION TYPE:     Is this a behavioral health patient? Has wanding been completed   Are belongings secure? Called outstanding consults:     STAT labs collected: Yes    Repeat Lactic Acid DUE? TIME DUE:     All STAT orders are complete: The following personal items will be sent with the patient during transfer to the floor: All valuables: clothing ,        ASSESSMENT:    NEURO:   NIH SCORE: 0,1-4,5-15,15-20,21-42: 0   KP SWALLOW SCREEN COMPLETE: Yes  ORIENTATION LEVEL: ORIENTATION LEVEL: Person, Place, Time, and Situation  Cognition: appropriate safety awareness, following commands  follows one step commands/direction, and    Speech: shows no evidence of impairment    Is patient impulsive? No  Is patient oriented? Yes  Do they follow commands? Yes  Is the patient ambulatory? Yes    FALL RISK? No  Interventions: Implemented/recommended use of non-skid footwear    RESPIRATORY:   Is patient on oxygen? No  Oxygen therapy: room air  O2 rate:     CARDIAC:   Is cardiac monitoring ordered? No    Last Rhythm: Rhythm including paccardio: Normal Sinus Rhythm   Patient to transfer with tele box on? No  Infusions: Meds; iv fluids: none  LINE ACCESS: 20G Peripheral IV , Forearm ,        /GI:   Continent Bowel/Bladder? Yes  Urinary Output:   Chronic or Acute: If Chronic, is it 1days old, was it changed prior to specimen collection? Yes  Was UA with reflex sent to lab? If no, collect and send prior to transport to inpatient area. INTEGUMENTARY:  IS THE PATIENT UNDRESSED? Yes  ARE THERE WOUNDS PRESENT? No  ARE THE WOUNDS DOCUMENTED?      RESTRAINTS IN USE:     IS THE DOCUMENTATION COMPLETE? Is there a current order? When does it ?         Vital Signs:  MEWS Score: 1/ Level of Consciousness: Alert (0)    Vitals:    23 0130 23 0145 23 0200 23 0204   BP:    (!) 160/83   Pulse:    70   Resp:       Temp:       TempSrc:       SpO2: 92% 97% 97%    Weight:              Pain 1:   ain Scale 1:     REVIEW:    IP UNIT CALLED NOTE IS READY: Yes   IF THERE ARE QUESTIONS, CALL ED   AT PHONE # [phone]                   Sandra Murillo RN  [phone]       Sandra Murillo RN  [phone]

## 2023-11-03 NOTE — DISCHARGE SUMMARY
[facility]  Hospitalist Discharge Summary    Patient ID:  Debbie Young  [id number]  66 y.o.  1945    PCP on record: LARON Parisi NP    Admit date: 11/3/2023  Discharge date and time: 11/3/2023     DISCHARGE DIAGNOSIS:    Principal Problem:    BPV (benign positional vertigo)  Active Problems:    Drug-induced encephalopathy    Insomnia    Anxiety    Chronic alcohol use    Alzheimer disease ([address])    Subclinical hypothyroidism    Dyslipidemia    HTN (hypertension)    CONSULTATIONS:      Excerpted HPI from H&P of Kelly Whyte DO:  Debbie Young is a 66 y.o. female with as below who presents after she was brought in by a friend after the patient was acting confused and talking nonsensically. The patient was recently diagnosed with Alzheimer's dementia in 2023. She lives alone and up until this point was doing fairly well. When discussing with the patient she is pleasantly confused and does not know why she is here. She does know her location, year, and  but no context surrounding today. The patient denies any HA, fever, chills, CP, SOB, abd pain, or dysuria. No further history was able to be obtained at this time. ______________________________________________________________________  DISCHARGE SUMMARY/HOSPITAL COURSE:  for full details see H&P, daily progress notes, labs, consult notes. Principal Problem:    BPV (benign positional vertigo)  History presented by patient's sister seemed most c/w Vertigo. Symptoms had cleared by the following morning  Pt was ambulatory w/o problems at time of discharge. MRI noted for chronic vascular disease.   Rx for Antivert sent to pharmacy for prn use    Active Problems:    Drug-induced encephalopathy    Insomnia    Anxiety    Chronic alcohol use    Alzheimer disease ([address])  Pt had more confusion from baseline at the time of admission  This improved to baseline by the following morning  This was attributed to the DIOVAN  Take 1 tablet by mouth daily            STOP taking these medications      levothyroxine 25 MCG tablet  Commonly known as: SYNTHROID     losartan 100 MG tablet  Commonly known as: COZAAR               Where to Get Your Medications        [facility] [address] [facility], [address] [place], [place] [id number]      Phone: [phone]   meclizine 12.5 MG tablet            My Recommended  Diet: Cardiac  Activity: Ad Swapna  Wound Care: none  Follow-up labs: Plan to check freeT4 / TSH 2-3 months    HOSPITALIST RECOMMENDATIONS  Continue efforts to improve sleep cycle  Keep twice daily meds at set time - like 8AM and 8PM  Have a set bed time and make effort to wind down by that time - like 10 PM  Exercise in evening before supper to assist some fatigue before the end on the day  Do all challenging / frustrating tasks in AM - ie bills, plans etc  Hold Thyroid tx with plan to follow up labs in 2-3 months - ie free T4 and TSH  [place] [address], [place]   [phone]    ______________________________________________________________________  DISPOSITION:    Home with Family: x   Home with HH/PT/OT/RN:    SNF/LTC:    TRISHA:    OTHER:        Condition at Discharge:  Stable  _____________________________________________________________________  Follow up with:   PCP : LARON Voss NP  Follow-up Information       Follow up With Specialties Details Why Contact Eden Reyes APRN - NP Nurse Practitioner Follow up in 1 week(s)  [place]  [phone]            Total time in minutes spent coordinating this discharge (includes going over instructions, follow-up, prescriptions, and preparing report for sign off to her PCP) :35 minutes    Signed:  Armando Amaya MD  [facility] Hospitalist  [phone]

## 2023-11-03 NOTE — ACP (ADVANCE CARE PLANNING)
Patient does not have a documented Advanced Directive on file but has named her son, Kenia Goins as her [address] which meets the criteria for Legal Kin hierarchy. Provided the patient with an Advanced Directive template along with the explanatory brochure: Your Right to Decide.

## 2023-11-03 NOTE — ASSESSMENT & PLAN NOTE
Holding home valsartan, losartan, and HCTZ given elevated serum creatinine  Recommend rechecking to see if patient is on two separate ARB's

## 2023-11-03 NOTE — ED TRIAGE NOTES
Patient arrived with a family friend, the patient appears to be rambling and unsure as to why she was brought to the hospital. Via the family friend the patient called her two hours ago stating that she felt like the house was spinning, family friends states she went to the patients home to check on her and felt she was altered and brought her into be seen, Patient arrival NIH scale is 0, all questions were answered correctly.

## 2023-11-03 NOTE — DISCHARGE INSTRUCTIONS
HOSPITALIST RECOMMENDATIONS    Continue efforts to improve sleep cycle  Keep twice daily meds at set time - like 8AM and 8PM  Have a set bed time and make effort to wind down by that time - like 10 PM  Exercise in evening before supper to assist some fatigue before the end on the day  Do all challenging / frustrating tasks in AM - ie bills, plans etc  Hold Thyroid tx with plan to follow up labs in 2-3 months - ie free T4 and TSH  [place] [address], [facility]

## 2023-11-03 NOTE — ASSESSMENT & PLAN NOTE
Likely worsening dementia  CT head w/out contrast: no acute intracranial process  Blood glucose 93, without leukocytosis, mild hyponatremia, ethanol level < 10, and UA without evidence of UTI  Check ammonia level, TSH, FT4, B12, folate, and UDS  Encephalopathy precautions  PT/OT  Consider MRI of brain to r/o anatomic abnormality  Consider EEG to r/o seizures/post-ictal state  Consider case management consult

## 2023-11-03 NOTE — H&P
V2.0  History and Physical      Name:  Daniela Sahu /Age/Sex: 1945  (66 y.o. female)   MRN & CSN:  [id number] & [id number] Encounter Date/Time: 23   Location:  [facility]/ PCP: LARON Parker NP       Hospital Day: 1    Assessment and Plan:   Daniela Sahu is a 66 y.o. female with a pmh as below who presents with Encephalopathy    Hospital Problems             Last Modified POA    * (Principal) Encephalopathy 11/3/2023 Yes    Alzheimer disease ([address]) 11/3/2023 Yes    Dyslipidemia 11/3/2023 Yes    HTN (hypertension) 11/3/2023 Yes       Plan:  Encephalopathy  Likely worsening dementia  CT head w/out contrast: no acute intracranial process  Blood glucose 93, without leukocytosis, mild hyponatremia, ethanol level < 10, and UA without evidence of UTI  Check ammonia level, TSH, FT4, B12, folate, and UDS  Encephalopathy precautions  PT/OT  Consider MRI of brain to r/o anatomic abnormality  Consider EEG to r/o seizures/post-ictal state  Consider case management consult    Alzheimer disease ([address])  Likely worsening  Recent diagnosis  Continue home medication    Dyslipidemia  Continue home medication    HTN (hypertension)  Holding home valsartan, losartan, and HCTZ given elevated serum creatinine  Recommend rechecking to see if patient is on two separate ARB's    Disposition:   Current Living situation: Home  Expected Disposition: Unclear currently. Placement? Estimated D/C: 1 day    Diet ADULT DIET;  Regular; Low Fat/Low Chol/High Fiber/2 gm Na   DVT Prophylaxis [] Lovenox, []  Heparin, [x] SCDs, [] Ambulation,  [] Eliquis, [] Xarelto, [] Coumadin   Code Status Full Code     Personally reviewed Lab Studies and Imaging     History from:     History obtained from ED provider and chart review, reason patient could not give history:  Dementia    History of Present Illness:     Chief Complaint: encephalopathy  Daniela Sahu is a 66 y.o. female with as below who presents after she was brought in by a friend after the patient was acting confused and talking nonsensically. The patient was recently diagnosed with Alzheimer's dementia in 2023. She lives alone and up until this point was doing fairly well. When discussing with the patient she is pleasantly confused and does not know why she is here. She does know her location, year, and  but no context surrounding today. The patient denies any HA, fever, chills, CP, SOB, abd pain, or dysuria. No further history was able to be obtained at this time. Review of Systems:    Review of Systems   Unable to perform ROS: Dementia       Pertinent positives and negatives discussed in HPI     Objective:   No intake or output data in the 24 hours ending 23   Vitals:   Vitals:    23 0145 230 23   BP:   (!) 160/83    Pulse:   70    Resp:       Temp:       TempSrc:       SpO2: 97% 97%     Weight:    59.2 kg (130 lb 8 oz)   Height:    1.575 m (5' 2\")       Medications Prior to Admission     Prior to Admission medications    Medication Sig Start Date End Date Taking?  Authorizing Provider   QUEtiapine (SEROQUEL) 100 MG tablet Take 1 tablet by mouth nightly 10/2/23   Creola Felty, MD   mirtazapine (REMERON) 15 MG tablet Take 1 tablet by mouth nightly 23   Creola Felty, MD   propranolol (INDERAL LA) 120 MG extended release capsule Take 1 capsule by mouth daily 23   Chiquis Hoang MD   betamethasone dipropionate 0.05 % cream APPLY TWICE A DAY FOR 2 WEEKS TO BITES LEGS, ARMS 3/8/18   Nora Chowdary MD   donepezil (ARICEPT) 10 MG tablet Take 1 tablet by mouth nightly 22  Nora Chowdary MD   hydroCHLOROthiazide (HYDRODIURIL) 12.5 MG tablet  22   Nora Chowdary MD   losartan (COZAAR) 100 MG tablet  22   Nora Chowdary MD   levothyroxine (SYNTHROID) 25 MCG tablet TAKE 1 TABLET BY MOUTH EVERY MORNING 30 MINUTES BEFORE BREAKFAST 5/15/23   Porter Akins Bachelor.,

## 2023-11-03 NOTE — PLAN OF CARE
Med/surg nurse to page when patient has arrived to unit and ready for teledoc visit. Thank you.      Electronically signed by Joanna Delgadillo DO on 11/3/2023 at 2:08 AM
Problem: Safety - Adult  Goal: Free from fall injury  Outcome: Progressing
completely independent w/ ambulation, ADLs and IADLs per her normal routine; (+) drives regularly; several siblings live very close by  Social/Functional History  Lives With: Alone  Type of Home: House  Home Layout: Two level  Home Access: Stairs to enter with rails  Entrance Stairs - Number of Steps: 2-3 but main bedroom and bathroom on the 2nd floor w/ 10 steps and L HR  Entrance Stairs - Rails: Left  Bathroom Shower/Tub: Tub/Shower unit  Bathroom Toilet: Standard  Home Equipment: None  Has the patient had two or more falls in the past year or any fall with injury in the past year?: No  ADL Assistance: Independent  Homemaking Assistance: Independent  Homemaking Responsibilities: Yes  Meal Prep Responsibility: Primary  Laundry Responsibility: Primary  Cleaning Responsibility: Primary  Shopping Responsibility: Primary  Health Care Management: Primary  Ambulation Assistance: Independent  Transfer Assistance: Independent  Active : Yes  Mode of Transportation: Car  Occupation: Retired  IADL Comments: independent per her normal routine  Critical Behavior:  Orientation  Overall Orientation Status: Impaired  Orientation Level: Disoriented to situation;Oriented to place;Oriented to time;Oriented to person  Cognition  Overall Cognitive Status: Exceptions  Arousal/Alertness: Appropriate responses to stimuli  Following Commands: Follows one step commands consistently  Attention Span: Appears intact  Memory: Decreased recall of recent events  Safety Judgement: Decreased awareness of need for safety  Problem Solving: Able to problem solve independently  Insights: Decreased awareness of deficits  Initiation: Does not require cues  Sequencing: Does not require cues  Cognition Comment: Up walking around in room, looking for phone, unable to recall who brought her here or why she is here. She continued looking for phone when it was suggested sister took her purse home.  She was asked to remain in chair and impulsively gets

## 2023-11-03 NOTE — PROGRESS NOTES
Patient has become agitated and very irritable. Patient is angry that we are using a bed alarm for safety. She is angry that her sister took her purse and some of her clothes home. Patient states that she feels like her family tricked her. Attempted to explain to patient why she was admitted to the hospital and that her family was just concerned about her safety and well being. Patient refuses to stay seated and is walking around in her room after repeated request for her to sit down. Call placed to family.

## 2023-11-03 NOTE — CARE COORDINATION
Care Management Initial Assessment       RUR: NA  Observation  Readmission? No  1st IM letter given? No  1st  letter given: No        11/03/23 1508   Service Assessment   Patient Orientation Alert and Oriented;Person;Place;Situation;Self   Cognition Alert   History Provided By Patient; Child/Family   Primary Caregiver Self   Support Systems Children;Family Members   PCP Verified by CM Yes   Prior Functional Level Independent in ADLs/IADLs   Current Functional Level Independent in ADLs/IADLs   Can patient return to prior living arrangement Yes   Ability to make needs known: Good   Family able to assist with home care needs: Yes   Would you like for me to discuss the discharge plan with any other family members/significant others, and if so, who? Yes  Vickie Mchugh, Son: and siblings)   Financial Resources Medicare  ()   Community Resources None   Social/Functional History   Lives With Alone   [address] of [address] None   Active  Yes   Discharge Planning   Type of Residence House   Current Services Prior To Admission None   Patient expects to be discharged to: [place]     Met with the patient and her sister, Sindi Suh at bedside to review and discuss plan of care and disposition needs. Patient confirmed demographics, insurance and emergency contact on file. Patient lives in her home alone. At baseline, patient is independent with ADLs and driving. Care Management Introductory letter with contact information given to patient and her sister. Patient placed in Observation status. MOON Observation Notification letter explained to the patient. She verbalized understanding. Copy to her and copy placed in the patient's chart. Patient has a large very supportive family. According to Dale Schmidt ([phone]), patient's sister, she lives very close to the patient and other siblings take turns watching out for her.  Patient does not have a documented Advanced Directive but she named her son Leander Dick Tad,[phone] as her [address] which meets the criteria for the legal hierarchy of assignment for Advanced Directive. She also stated that any of her sisters could be assigned as well. Advance Care Planning     General Advance Care Planning (ACP) Conversation    Date of Conversation: 11/3/2023  Conducted with: Patient with Decision Making Capacity    Healthcare Decision Maker:  No healthcare decision makers have been documented. Click here to complete [address] including selection of the Healthcare Decision Maker Relationship (ie \"Primary\")   Today we documented Decision Maker(s) consistent with Legal Next of Kin hierarchy. Content/Action Overview:  Has NO ACP documents/care preferences - information provided, considering goals and options  Reviewed DNR/DNI and patient elects Full Code (Attempt Resuscitation)  Patient does not have a documented AD on file. An Advanced Directive template was given to the patient along with the brochure: Your Right to Decide.   NA    Length of Voluntary ACP Conversation in minutes:  35 minutes    Sylvia Pedraza

## 2023-11-04 LAB
EKG ATRIAL RATE: 64 BPM
EKG DIAGNOSIS: NORMAL
EKG P AXIS: 56 DEGREES
EKG P-R INTERVAL: 160 MS
EKG Q-T INTERVAL: 398 MS
EKG QRS DURATION: 72 MS
EKG QTC CALCULATION (BAZETT): 450 MS
EKG R AXIS: -14 DEGREES
EKG T AXIS: 19 DEGREES
EKG VENTRICULAR RATE: 77 BPM

## 2023-11-06 NOTE — CARE COORDINATION
Transition of Care Plan:    RUR: N/A OBS  Prior Level of Functioning: Independent  Disposition: Home  If SNF or IPR: Date FOC offered: N/A  Date FOC received:   Accepting facility:   Date authorization started with reference number:   Date authorization received and expires: Follow up appointments: Follow up with PCP Christine Garcia NP  DME needed:   Transportation at discharge: POV  IM/IMM Medicare/ letter given: N/A  Is patient a  and connected with VA? N/A   If yes, was [employer] transfer form completed and VA notified? Caregiver Contact:   Discharge Caregiver contacted prior to discharge? Care Conference needed? Barriers to discharge:  NONE       11/06/23 [facility]   Transition of Care Consult (CM Consult) Discharge [facility] None   [place] Resource Information Provided? No   Mode of Transport at Discharge Other (see comment)  (POV)   Condition of Participation: Discharge Planning   The Plan for Transition of Care is related to the following treatment goals: Home   The Patient and/or Patient Representative was provided with a Choice of Provider?   (N/A)   Freedom of Choice list was provided with basic dialogue that supports the patient's individualized plan of care/goals, treatment preferences, and shares the quality data associated with the providers?    (N/A)     See CM  note this date 9:55am re: CM post hospital discharge follow up calls. Ms. Patti Paredes discharged to home 11/3/23.

## 2023-11-06 NOTE — CARE COORDINATION
[address] [place] [address] discharge follow up call to patient's emergency contact Tori Catching [phone]. She said I should call the son Velma Gonzalez. I called him [phone] and left a voice mail message to have call returned.

## 2023-12-14 RX ORDER — MIRTAZAPINE 15 MG/1
15 TABLET, FILM COATED ORAL NIGHTLY
Qty: 90 TABLET | Refills: 1 | Status: SHIPPED | OUTPATIENT
Start: 2023-12-14

## 2023-12-19 PROBLEM — F33.1 MODERATE EPISODE OF RECURRENT MAJOR DEPRESSIVE DISORDER (HCC): Status: RESOLVED | Noted: 2023-05-26 | Resolved: 2023-12-19

## 2024-03-19 ENCOUNTER — HOSPITAL ENCOUNTER (OUTPATIENT)
Facility: HOSPITAL | Age: 79
Discharge: HOME | End: 2024-03-22
Payer: MEDICARE

## 2024-03-19 PROCEDURE — 99214 OFFICE O/P EST MOD 30 MIN: CPT | Performed by: PSYCHIATRY & NEUROLOGY

## 2024-03-19 NOTE — PROGRESS NOTES
\"1 or 2\" glasses of wine each night, and also continues to tell me she has alcohol-free wine that she can drink, which I instructed her to do.  Objectively, she's much less labile, and no longer getting spontaneously tearful or overly animated. She reports that her N/V functioning has actually been good except for the sleep. No longer taking 2-4 Benadryl tabs, and that may be helping her a bit as well. She still tends to minimize her cognitive issues as well as the significance of her alcohol use. Next option may be to try Lunesta or Belsomra (failed Ambien, Trazodone 100 mg, and 25 mg of Seroquel).     CURRENT MEDICATION:  Mirtazapine 15 mg qhs   Seroquel 100 mg qhs  Aricept 10 mg qhs     MENTAL STATUS EXAM:  Tereza was noted to be a casually dressed, adequately groomed, 78-year-old, who appeared younger than her stated age.  She was still pleasant and cooperative, and exhibited a fairly full affective range again this time. She seemed to be less labile and not as tearful this time, but she continues to briefly laugh often. There was no evidence of any dysphoria or mood instability, and she denied feeling hopeless or having any suicidal thoughts at all. There was also no evidence of any jeff or hypomania nor any agitation evident. Similarly, there was no evidence of any psychosis such as hallucinations or delusions.  Her judgment and insight still appear to be impaired, particularly insight, and her cognitive functioning showed the same deficits in areas of short-term and recent memory, processing issues, and aspects of insight and judgment.     DIAGNOSTIC IMPRESSION:  AXIS I:  1.  Major Depression, very mild (F33.0).  2.  Mild Cognitive Impairment, unspecified (G31.84).  3.  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  4.  Alcohol Use disorder (F10.10).  AXIS II:  Deferred.  AXIS III:  HTN; HLD; hypothyroidism, recent syncope.     PLAN:  1.  Continue the Mirtazapine at 15 mg qhs--has 3

## 2024-06-19 RX ORDER — MIRTAZAPINE 15 MG/1
15 TABLET, FILM COATED ORAL NIGHTLY
Qty: 90 TABLET | Refills: 1 | Status: SHIPPED | OUTPATIENT
Start: 2024-06-19

## 2024-07-22 RX ORDER — MIRTAZAPINE 15 MG/1
15 TABLET, FILM COATED ORAL NIGHTLY
Qty: 90 TABLET | Refills: 0 | Status: SHIPPED | OUTPATIENT
Start: 2024-07-22

## 2024-07-22 RX ORDER — QUETIAPINE FUMARATE 100 MG/1
100 TABLET, FILM COATED ORAL NIGHTLY
Qty: 90 TABLET | Refills: 0 | Status: SHIPPED | OUTPATIENT
Start: 2024-07-22

## 2024-08-10 ENCOUNTER — APPOINTMENT (OUTPATIENT)
Facility: HOSPITAL | Age: 79
End: 2024-08-10
Payer: MEDICARE

## 2024-08-10 ENCOUNTER — HOSPITAL ENCOUNTER (EMERGENCY)
Facility: HOSPITAL | Age: 79
Discharge: HOME | End: 2024-08-11
Attending: EMERGENCY MEDICINE
Payer: MEDICARE

## 2024-08-10 VITALS
OXYGEN SATURATION: 99 % | WEIGHT: 130.9 LBS | DIASTOLIC BLOOD PRESSURE: 92 MMHG | HEIGHT: 62 IN | BODY MASS INDEX: 24.09 KG/M2 | TEMPERATURE: 98.4 F | RESPIRATION RATE: 14 BRPM | SYSTOLIC BLOOD PRESSURE: 111 MMHG | HEART RATE: 62 BPM

## 2024-08-10 DIAGNOSIS — R55 SYNCOPE, VASOVAGAL: Primary | ICD-10-CM

## 2024-08-10 LAB
ALBUMIN SERPL-MCNC: 3.2 G/DL (ref 3.5–5)
ALBUMIN/GLOB SERPL: 1.2 (ref 1.1–2.2)
ALP SERPL-CCNC: 68 U/L (ref 45–117)
ALT SERPL-CCNC: 13 U/L (ref 12–78)
ANION GAP SERPL CALC-SCNC: 13 MMOL/L (ref 5–15)
ANION GAP SERPL CALC-SCNC: 17 MMOL/L (ref 5–15)
AST SERPL-CCNC: 18 U/L (ref 15–37)
BASOPHILS # BLD: 0.1 K/UL (ref 0–0.1)
BASOPHILS NFR BLD: 1 % (ref 0–1)
BILIRUB SERPL-MCNC: 0.5 MG/DL (ref 0.2–1)
BUN SERPL-MCNC: 25 MG/DL (ref 6–20)
BUN SERPL-MCNC: 25 MG/DL (ref 6–20)
BUN/CREAT SERPL: 20 (ref 12–20)
BUN/CREAT SERPL: 22 (ref 12–20)
CALCIUM SERPL-MCNC: 8.4 MG/DL (ref 8.5–10.1)
CALCIUM SERPL-MCNC: 8.5 MG/DL (ref 8.5–10.1)
CHLORIDE SERPL-SCNC: 104 MMOL/L (ref 97–108)
CHLORIDE SERPL-SCNC: 104 MMOL/L (ref 97–108)
CO2 SERPL-SCNC: 17 MMOL/L (ref 21–32)
CO2 SERPL-SCNC: 24 MMOL/L (ref 21–32)
CREAT SERPL-MCNC: 1.15 MG/DL (ref 0.55–1.02)
CREAT SERPL-MCNC: 1.24 MG/DL (ref 0.55–1.02)
DIFFERENTIAL METHOD BLD: ABNORMAL
EKG ATRIAL RATE: 54 BPM
EKG DIAGNOSIS: NORMAL
EKG P AXIS: 66 DEGREES
EKG P-R INTERVAL: 164 MS
EKG Q-T INTERVAL: 492 MS
EKG QRS DURATION: 72 MS
EKG QTC CALCULATION (BAZETT): 466 MS
EKG R AXIS: -10 DEGREES
EKG T AXIS: -42 DEGREES
EKG VENTRICULAR RATE: 54 BPM
EOSINOPHIL # BLD: 0.1 K/UL (ref 0–0.4)
EOSINOPHIL NFR BLD: 1 % (ref 0–7)
ERYTHROCYTE [DISTWIDTH] IN BLOOD BY AUTOMATED COUNT: 14 % (ref 11.5–14.5)
ETHANOL SERPL-MCNC: <10 MG/DL (ref 0–0.08)
GLOBULIN SER CALC-MCNC: 2.7 G/DL (ref 2–4)
GLUCOSE SERPL-MCNC: 101 MG/DL (ref 65–100)
GLUCOSE SERPL-MCNC: 101 MG/DL (ref 65–100)
HCT VFR BLD AUTO: 37.5 % (ref 35–47)
HGB BLD-MCNC: 12.4 G/DL (ref 11.5–16)
IMM GRANULOCYTES # BLD AUTO: 0 K/UL (ref 0–0.04)
IMM GRANULOCYTES NFR BLD AUTO: 0 % (ref 0–0.5)
LYMPHOCYTES # BLD: 1.4 K/UL (ref 0.8–3.5)
LYMPHOCYTES NFR BLD: 14 % (ref 12–49)
MAGNESIUM SERPL-MCNC: 1.7 MG/DL (ref 1.6–2.4)
MCH RBC QN AUTO: 33.4 PG (ref 26–34)
MCHC RBC AUTO-ENTMCNC: 33.1 G/DL (ref 30–36.5)
MCV RBC AUTO: 101.1 FL (ref 80–99)
MONOCYTES # BLD: 0.6 K/UL (ref 0–1)
MONOCYTES NFR BLD: 6 % (ref 5–13)
NEUTS SEG # BLD: 7.6 K/UL (ref 1.8–8)
NEUTS SEG NFR BLD: 78 % (ref 32–75)
NRBC # BLD: 0 K/UL (ref 0–0.01)
NRBC BLD-RTO: 0 PER 100 WBC
PLATELET # BLD AUTO: 346 K/UL (ref 150–400)
PMV BLD AUTO: 10.2 FL (ref 8.9–12.9)
POTASSIUM SERPL-SCNC: 3.6 MMOL/L (ref 3.5–5.1)
POTASSIUM SERPL-SCNC: 3.8 MMOL/L (ref 3.5–5.1)
PROT SERPL-MCNC: 5.9 G/DL (ref 6.4–8.2)
RBC # BLD AUTO: 3.71 M/UL (ref 3.8–5.2)
SODIUM SERPL-SCNC: 138 MMOL/L (ref 136–145)
SODIUM SERPL-SCNC: 141 MMOL/L (ref 136–145)
TROPONIN I SERPL HS-MCNC: 7 NG/L (ref 0–51)
WBC # BLD AUTO: 9.8 K/UL (ref 3.6–11)

## 2024-08-10 PROCEDURE — 85027 COMPLETE CBC AUTOMATED: CPT

## 2024-08-10 PROCEDURE — 93005 ELECTROCARDIOGRAM TRACING: CPT | Performed by: EMERGENCY MEDICINE

## 2024-08-10 PROCEDURE — 99285 EMERGENCY DEPT VISIT HI MDM: CPT

## 2024-08-10 PROCEDURE — 36415 COLL VENOUS BLD VENIPUNCTURE: CPT

## 2024-08-10 PROCEDURE — 84484 ASSAY OF TROPONIN QUANT: CPT

## 2024-08-10 PROCEDURE — 82077 ASSAY SPEC XCP UR&BREATH IA: CPT

## 2024-08-10 PROCEDURE — 83735 ASSAY OF MAGNESIUM: CPT

## 2024-08-10 PROCEDURE — 81001 URINALYSIS AUTO W/SCOPE: CPT

## 2024-08-10 PROCEDURE — 70450 CT HEAD/BRAIN W/O DYE: CPT

## 2024-08-10 PROCEDURE — 80053 COMPREHEN METABOLIC PANEL: CPT

## 2024-08-10 PROCEDURE — 2580000003 HC RX 258: Performed by: EMERGENCY MEDICINE

## 2024-08-10 RX ORDER — 0.9 % SODIUM CHLORIDE 0.9 %
1000 INTRAVENOUS SOLUTION INTRAVENOUS ONCE
Status: COMPLETED | OUTPATIENT
Start: 2024-08-10 | End: 2024-08-10

## 2024-08-10 RX ADMIN — SODIUM CHLORIDE 1000 ML: 9 INJECTION, SOLUTION INTRAVENOUS at 21:20

## 2024-08-10 RX ADMIN — SODIUM CHLORIDE 1000 ML: 9 INJECTION, SOLUTION INTRAVENOUS at 22:30

## 2024-08-10 ASSESSMENT — ENCOUNTER SYMPTOMS
NAUSEA: 0
BACK PAIN: 0
SORE THROAT: 0
VOMITING: 0
ABDOMINAL PAIN: 0
SHORTNESS OF BREATH: 0
DIARRHEA: 1

## 2024-08-10 ASSESSMENT — PAIN - FUNCTIONAL ASSESSMENT: PAIN_FUNCTIONAL_ASSESSMENT: NONE - DENIES PAIN

## 2024-08-11 LAB
APPEARANCE UR: CLEAR
BACTERIA URNS QL MICRO: ABNORMAL /HPF
BILIRUB UR QL: NEGATIVE
COLOR UR: ABNORMAL
EPITH CASTS URNS QL MICRO: ABNORMAL /LPF
GLUCOSE UR STRIP.AUTO-MCNC: NEGATIVE MG/DL
HGB UR QL STRIP: NEGATIVE
HYALINE CASTS URNS QL MICRO: ABNORMAL /LPF (ref 0–5)
KETONES UR QL STRIP.AUTO: 15 MG/DL
LEUKOCYTE ESTERASE UR QL STRIP.AUTO: NEGATIVE
MUCOUS THREADS URNS QL MICRO: ABNORMAL /LPF
NITRITE UR QL STRIP.AUTO: NEGATIVE
PH UR STRIP: 6 (ref 5–8)
PROT UR STRIP-MCNC: NEGATIVE MG/DL
RBC #/AREA URNS HPF: ABNORMAL /HPF (ref 0–5)
SP GR UR REFRACTOMETRY: 1.02 (ref 1–1.03)
URATE CRY URNS QL MICRO: ABNORMAL
URINE CULTURE IF INDICATED: ABNORMAL
UROBILINOGEN UR QL STRIP.AUTO: 0.2 EU/DL (ref 0.2–1)
WBC URNS QL MICRO: ABNORMAL /HPF (ref 0–4)

## 2024-08-11 NOTE — ED TRIAGE NOTES
Syncopal episode witnessed denies hitting her head or pain.  Reports dizziness at the time of arrival.

## 2024-08-11 NOTE — ED PROVIDER NOTES
son on lab results patient without any complaints, repeat labs pending.  IV fluids infusing [MF]   2334 Reviewed laboratory studies with patient family, patient without any complaints, will ambulate and assess for any further symptoms.  Patient states she is ready for discharge [MF]   Sun Aug 11, 2024   0044 Patient reports she feels back to baseline no complaints, reviewed urine results with son, need for follow-up with PCP increasing fluid intake and return here for change or worsening symptom [MF]      ED Course User Index  [MF] JARET Johnson MD           12:44 AM    Pt has been re-examined and states that they are feeling better and have no new complaints.  Laboratory tests, medications, x-rays, diagnosis, follow up plan and return instructions have been reviewed and discussed with the patient and/or family. Pt and/or family were instructed on symptoms that may arise after discharge requiring re-evaluation by a physician. Pt and/or family have had the opportunity to ask questions about their care. Patient and/or family verbalized understanding and agreement with care plan, follow up and return instructions. Patient and/or family agree to return in 48 hours if their symptoms are not improving or immediately if they have any change in their condition.         I have also put together some discharge instructions for patient that include: 1) educational information regarding their diagnosis, 2) how to care for their diagnosis at home, as well a 3) list of reasons why they would want to return to the ED prior to their follow-up appointment, should their condition change.       I completed a structured, evidence-based clinical evaluation to screen for cardiac and other potentially dangerous causes of syncope in this patient. The evidence indicates that the patient is very low risk for a cardiac or other dangerous cause of syncope and this is consistent with my clinical intuition. The risk of further workup or

## 2024-08-13 LAB
EKG ATRIAL RATE: 54 BPM
EKG DIAGNOSIS: NORMAL
EKG P AXIS: 66 DEGREES
EKG P-R INTERVAL: 164 MS
EKG Q-T INTERVAL: 492 MS
EKG QRS DURATION: 72 MS
EKG QTC CALCULATION (BAZETT): 466 MS
EKG R AXIS: -10 DEGREES
EKG T AXIS: -42 DEGREES
EKG VENTRICULAR RATE: 54 BPM

## 2025-04-24 ENCOUNTER — APPOINTMENT (OUTPATIENT)
Facility: HOSPITAL | Age: 80
End: 2025-04-24
Payer: MEDICARE

## 2025-04-24 ENCOUNTER — HOSPITAL ENCOUNTER (EMERGENCY)
Facility: HOSPITAL | Age: 80
Discharge: HOME | End: 2025-04-24
Attending: EMERGENCY MEDICINE
Payer: MEDICARE

## 2025-04-24 VITALS
HEART RATE: 59 BPM | TEMPERATURE: 97.6 F | BODY MASS INDEX: 21.03 KG/M2 | RESPIRATION RATE: 18 BRPM | DIASTOLIC BLOOD PRESSURE: 75 MMHG | WEIGHT: 115 LBS | SYSTOLIC BLOOD PRESSURE: 135 MMHG | OXYGEN SATURATION: 97 %

## 2025-04-24 DIAGNOSIS — M65.20 CALCIFIC TENDINITIS: Primary | ICD-10-CM

## 2025-04-24 PROCEDURE — 96372 THER/PROPH/DIAG INJ SC/IM: CPT

## 2025-04-24 PROCEDURE — 6370000000 HC RX 637 (ALT 250 FOR IP): Performed by: EMERGENCY MEDICINE

## 2025-04-24 PROCEDURE — 6360000002 HC RX W HCPCS: Performed by: EMERGENCY MEDICINE

## 2025-04-24 PROCEDURE — 73060 X-RAY EXAM OF HUMERUS: CPT

## 2025-04-24 PROCEDURE — 73030 X-RAY EXAM OF SHOULDER: CPT

## 2025-04-24 PROCEDURE — 99284 EMERGENCY DEPT VISIT MOD MDM: CPT

## 2025-04-24 RX ORDER — MELOXICAM 7.5 MG/1
7.5 TABLET ORAL DAILY
Qty: 7 TABLET | Refills: 0 | Status: SHIPPED | OUTPATIENT
Start: 2025-04-24 | End: 2025-05-01

## 2025-04-24 RX ORDER — KETOROLAC TROMETHAMINE 30 MG/ML
30 INJECTION, SOLUTION INTRAMUSCULAR; INTRAVENOUS
Status: COMPLETED | OUTPATIENT
Start: 2025-04-24 | End: 2025-04-24

## 2025-04-24 RX ORDER — MORPHINE SULFATE 4 MG/ML
4 INJECTION, SOLUTION INTRAMUSCULAR; INTRAVENOUS
Status: DISCONTINUED | OUTPATIENT
Start: 2025-04-24 | End: 2025-04-24

## 2025-04-24 RX ORDER — OXYCODONE HYDROCHLORIDE 5 MG/1
5 TABLET ORAL
Refills: 0 | Status: COMPLETED | OUTPATIENT
Start: 2025-04-24 | End: 2025-04-24

## 2025-04-24 RX ADMIN — KETOROLAC TROMETHAMINE 30 MG: 30 INJECTION, SOLUTION INTRAMUSCULAR at 18:42

## 2025-04-24 RX ADMIN — OXYCODONE HYDROCHLORIDE 5 MG: 5 TABLET ORAL at 17:48

## 2025-04-24 ASSESSMENT — PAIN SCALES - GENERAL
PAINLEVEL_OUTOF10: 8
PAINLEVEL_OUTOF10: 0

## 2025-04-24 ASSESSMENT — PAIN DESCRIPTION - ORIENTATION: ORIENTATION: RIGHT;UPPER

## 2025-04-24 ASSESSMENT — PAIN DESCRIPTION - LOCATION: LOCATION: ARM

## 2025-04-24 ASSESSMENT — PAIN - FUNCTIONAL ASSESSMENT: PAIN_FUNCTIONAL_ASSESSMENT: 0-10

## 2025-04-24 NOTE — ED TRIAGE NOTES
PT arrived with c/o right upper arm pain, unknown when this started or mechanism of injury. Pt has a hx of alzheimers lives with sister who also is a poor historian. The patient states she believes she fell but is unsure when or how. Took an ASA about 2 hours ago

## 2025-05-05 NOTE — ED PROVIDER NOTES
EMERGENCY DEPARTMENT HISTORY AND PHYSICAL EXAM      Date: 2025  Patient Name: Natalya León    History of Presenting Illness     Chief Complaint   Patient presents with    Arm Pain       History Provided By: Patient    HPI: Natalya León, 80 y.o. female with PMHx as noted below presents emergency department complaints of right upper arm pain.  Patient states she thinks she may have tripped while going up the stairs injuring it a few days ago.  Otherwise reports pain with movement.  No other injuries.    Pt denies any other alleviating or exacerbating factors. Additionally, pt specifically denies any recent fever, chills, headache, nausea, vomiting, abdominal pain, CP, SOB, lightheadedness, dizziness, numbness, weakness, BLE swelling, heart palpitations, urinary sxs, diarrhea, constipation, melena, hematochezia, cough, or congestion.    PCP: Martin Galo APRN - NP    No current facility-administered medications for this encounter.     Current Outpatient Medications   Medication Sig Dispense Refill    meloxicam (MOBIC) 7.5 MG tablet Take 1 tablet by mouth daily for 7 days 7 tablet 0    QUEtiapine (SEROQUEL) 100 MG tablet Take 1 tablet by mouth nightly 90 tablet 0    mirtazapine (REMERON) 15 MG tablet Take 1 tablet by mouth nightly 90 tablet 0    propranolol (INDERAL LA) 120 MG extended release capsule Take 1 capsule by mouth daily 30 capsule 3    donepezil (ARICEPT) 10 MG tablet Take 1 tablet by mouth nightly 30 tablet 5    hydroCHLOROthiazide (HYDRODIURIL) 12.5 MG tablet       valsartan (DIOVAN) 160 MG tablet Take 1 tablet by mouth daily 30 tablet 0       Past History     Past Medical History:  Past Medical History:   Diagnosis Date    Anxiety     Dyslipidemia     HTN (hypertension)     Hypothyroidism     Insomnia     Osteoporosis        Past Surgical History:  Past Surgical History:   Procedure Laterality Date     SECTION      TUBAL LIGATION         Family History:  Family History